# Patient Record
Sex: FEMALE | Race: WHITE | NOT HISPANIC OR LATINO | Employment: OTHER | URBAN - METROPOLITAN AREA
[De-identification: names, ages, dates, MRNs, and addresses within clinical notes are randomized per-mention and may not be internally consistent; named-entity substitution may affect disease eponyms.]

---

## 2017-01-03 ENCOUNTER — COMMUNICATION - HEALTHEAST (OUTPATIENT)
Dept: INTERNAL MEDICINE | Facility: CLINIC | Age: 77
End: 2017-01-03

## 2017-01-03 DIAGNOSIS — E11.9 TYPE 2 DIABETES MELLITUS (H): ICD-10-CM

## 2017-03-20 ENCOUNTER — COMMUNICATION - HEALTHEAST (OUTPATIENT)
Dept: INTERNAL MEDICINE | Facility: CLINIC | Age: 77
End: 2017-03-20

## 2017-03-20 DIAGNOSIS — E11.9 TYPE 2 DIABETES MELLITUS (H): ICD-10-CM

## 2017-03-24 ENCOUNTER — COMMUNICATION - HEALTHEAST (OUTPATIENT)
Dept: INTERNAL MEDICINE | Facility: CLINIC | Age: 77
End: 2017-03-24

## 2017-03-24 DIAGNOSIS — E11.9 DM (DIABETES MELLITUS) (H): ICD-10-CM

## 2017-06-21 ENCOUNTER — COMMUNICATION - HEALTHEAST (OUTPATIENT)
Dept: INTERNAL MEDICINE | Facility: CLINIC | Age: 77
End: 2017-06-21

## 2017-06-21 DIAGNOSIS — E78.5 HYPERLIPIDEMIA: ICD-10-CM

## 2017-06-21 DIAGNOSIS — E11.9 TYPE 2 DIABETES MELLITUS (H): ICD-10-CM

## 2017-06-21 DIAGNOSIS — I10 HTN (HYPERTENSION): ICD-10-CM

## 2017-06-26 ENCOUNTER — COMMUNICATION - HEALTHEAST (OUTPATIENT)
Dept: INTERNAL MEDICINE | Facility: CLINIC | Age: 77
End: 2017-06-26

## 2017-07-03 ENCOUNTER — HOSPITAL ENCOUNTER (OUTPATIENT)
Dept: MAMMOGRAPHY | Facility: CLINIC | Age: 77
Discharge: HOME | End: 2017-07-03
Attending: INTERNAL MEDICINE

## 2017-07-03 DIAGNOSIS — Z12.31 VISIT FOR SCREENING MAMMOGRAM: ICD-10-CM

## 2017-07-25 ENCOUNTER — COMMUNICATION - HEALTHEAST (OUTPATIENT)
Dept: INTERNAL MEDICINE | Facility: CLINIC | Age: 77
End: 2017-07-25

## 2017-07-25 DIAGNOSIS — I10 HTN (HYPERTENSION): ICD-10-CM

## 2017-07-25 DIAGNOSIS — E11.9 TYPE 2 DIABETES MELLITUS (H): ICD-10-CM

## 2017-07-25 DIAGNOSIS — E78.5 HYPERLIPIDEMIA: ICD-10-CM

## 2017-08-01 ENCOUNTER — OFFICE VISIT - HEALTHEAST (OUTPATIENT)
Dept: INTERNAL MEDICINE | Facility: CLINIC | Age: 77
End: 2017-08-01

## 2017-08-01 DIAGNOSIS — E11.9 DM (DIABETES MELLITUS) (H): ICD-10-CM

## 2017-08-01 DIAGNOSIS — I10 ESSENTIAL HYPERTENSION: ICD-10-CM

## 2017-08-01 DIAGNOSIS — E79.0 HYPERURICEMIA: ICD-10-CM

## 2017-08-01 DIAGNOSIS — N18.9 CHRONIC KIDNEY DISEASE, UNSPECIFIED: ICD-10-CM

## 2017-08-01 DIAGNOSIS — Z00.00 ROUTINE GENERAL MEDICAL EXAMINATION AT A HEALTH CARE FACILITY: ICD-10-CM

## 2017-08-01 DIAGNOSIS — E11.9 TYPE 2 DIABETES MELLITUS (H): ICD-10-CM

## 2017-08-01 DIAGNOSIS — E55.9 VITAMIN D DEFICIENCY: ICD-10-CM

## 2017-08-01 DIAGNOSIS — E78.5 HYPERLIPEMIA: ICD-10-CM

## 2017-08-01 LAB
CHOLEST SERPL-MCNC: 166 MG/DL
FASTING STATUS PATIENT QL REPORTED: ABNORMAL
HBA1C MFR BLD: 9.2 % (ref 3.5–6)
HDLC SERPL-MCNC: 45 MG/DL
LDLC SERPL CALC-MCNC: 52 MG/DL
TRIGL SERPL-MCNC: 344 MG/DL

## 2017-08-01 ASSESSMENT — MIFFLIN-ST. JEOR: SCORE: 1468.43

## 2017-08-14 ENCOUNTER — AMBULATORY - HEALTHEAST (OUTPATIENT)
Dept: EDUCATION SERVICES | Facility: CLINIC | Age: 77
End: 2017-08-14

## 2017-08-21 ENCOUNTER — COMMUNICATION - HEALTHEAST (OUTPATIENT)
Dept: EDUCATION SERVICES | Facility: CLINIC | Age: 77
End: 2017-08-21

## 2017-08-23 ENCOUNTER — COMMUNICATION - HEALTHEAST (OUTPATIENT)
Dept: INTERNAL MEDICINE | Facility: CLINIC | Age: 77
End: 2017-08-23

## 2017-08-23 DIAGNOSIS — I10 HTN (HYPERTENSION): ICD-10-CM

## 2017-08-23 DIAGNOSIS — E78.5 HYPERLIPIDEMIA: ICD-10-CM

## 2017-08-28 ENCOUNTER — COMMUNICATION - HEALTHEAST (OUTPATIENT)
Dept: INTERNAL MEDICINE | Facility: CLINIC | Age: 77
End: 2017-08-28

## 2017-08-28 ENCOUNTER — AMBULATORY - HEALTHEAST (OUTPATIENT)
Dept: INTERNAL MEDICINE | Facility: CLINIC | Age: 77
End: 2017-08-28

## 2017-08-28 ENCOUNTER — AMBULATORY - HEALTHEAST (OUTPATIENT)
Dept: EDUCATION SERVICES | Facility: CLINIC | Age: 77
End: 2017-08-28

## 2017-08-28 DIAGNOSIS — E11.8 DIABETES MELLITUS TYPE 2 WITH COMPLICATIONS (H): ICD-10-CM

## 2017-08-28 DIAGNOSIS — I10 HTN (HYPERTENSION): ICD-10-CM

## 2017-09-01 ENCOUNTER — RECORDS - HEALTHEAST (OUTPATIENT)
Dept: ADMINISTRATIVE | Facility: OTHER | Age: 77
End: 2017-09-01

## 2017-09-07 ENCOUNTER — COMMUNICATION - HEALTHEAST (OUTPATIENT)
Dept: ADMINISTRATIVE | Facility: CLINIC | Age: 77
End: 2017-09-07

## 2017-09-07 DIAGNOSIS — E11.9 TYPE 2 DIABETES MELLITUS WITHOUT COMPLICATION, WITH LONG-TERM CURRENT USE OF INSULIN (H): ICD-10-CM

## 2017-09-07 DIAGNOSIS — Z79.4 TYPE 2 DIABETES MELLITUS WITHOUT COMPLICATION, WITH LONG-TERM CURRENT USE OF INSULIN (H): ICD-10-CM

## 2017-09-13 ENCOUNTER — OFFICE VISIT - HEALTHEAST (OUTPATIENT)
Dept: INTERNAL MEDICINE | Facility: CLINIC | Age: 77
End: 2017-09-13

## 2017-09-13 DIAGNOSIS — M85.80 OSTEOPENIA: ICD-10-CM

## 2017-09-13 DIAGNOSIS — E78.5 HYPERLIPEMIA: ICD-10-CM

## 2017-09-13 DIAGNOSIS — Z78.0 MENOPAUSE: ICD-10-CM

## 2017-09-13 DIAGNOSIS — E11.9 TYPE 2 DIABETES MELLITUS (H): ICD-10-CM

## 2017-09-13 DIAGNOSIS — N18.9 CHRONIC KIDNEY DISEASE, UNSPECIFIED: ICD-10-CM

## 2017-09-13 DIAGNOSIS — I10 ESSENTIAL HYPERTENSION: ICD-10-CM

## 2017-09-19 ENCOUNTER — OFFICE VISIT - HEALTHEAST (OUTPATIENT)
Dept: INTERNAL MEDICINE | Facility: CLINIC | Age: 77
End: 2017-09-19

## 2017-09-19 ENCOUNTER — COMMUNICATION - HEALTHEAST (OUTPATIENT)
Dept: INTERNAL MEDICINE | Facility: CLINIC | Age: 77
End: 2017-09-19

## 2017-09-19 DIAGNOSIS — I10 HTN (HYPERTENSION): ICD-10-CM

## 2017-09-26 ENCOUNTER — RECORDS - HEALTHEAST (OUTPATIENT)
Dept: BONE DENSITY | Facility: CLINIC | Age: 77
End: 2017-09-26

## 2017-09-26 ENCOUNTER — RECORDS - HEALTHEAST (OUTPATIENT)
Dept: ADMINISTRATIVE | Facility: OTHER | Age: 77
End: 2017-09-26

## 2017-09-26 DIAGNOSIS — M85.80 OTHER SPECIFIED DISORDERS OF BONE DENSITY AND STRUCTURE, UNSPECIFIED SITE: ICD-10-CM

## 2017-09-26 DIAGNOSIS — Z78.0 ASYMPTOMATIC MENOPAUSAL STATE: ICD-10-CM

## 2017-10-02 ENCOUNTER — AMBULATORY - HEALTHEAST (OUTPATIENT)
Dept: INTERNAL MEDICINE | Facility: CLINIC | Age: 77
End: 2017-10-02

## 2017-10-02 DIAGNOSIS — M81.0 OSTEOPOROSIS: ICD-10-CM

## 2017-10-04 ENCOUNTER — OFFICE VISIT - HEALTHEAST (OUTPATIENT)
Dept: INTERNAL MEDICINE | Facility: CLINIC | Age: 77
End: 2017-10-04

## 2017-10-04 DIAGNOSIS — M89.9 DISORDER OF BONE AND CARTILAGE: ICD-10-CM

## 2017-10-04 DIAGNOSIS — M94.9 DISORDER OF BONE AND CARTILAGE: ICD-10-CM

## 2017-10-04 DIAGNOSIS — E21.3 HYPERPARATHYROIDISM (H): ICD-10-CM

## 2017-10-17 ENCOUNTER — AMBULATORY - HEALTHEAST (OUTPATIENT)
Dept: EDUCATION SERVICES | Facility: CLINIC | Age: 77
End: 2017-10-17

## 2018-03-04 ENCOUNTER — COMMUNICATION - HEALTHEAST (OUTPATIENT)
Dept: INTERNAL MEDICINE | Facility: CLINIC | Age: 78
End: 2018-03-04

## 2018-03-04 DIAGNOSIS — E11.9 DIABETES (H): ICD-10-CM

## 2018-03-04 DIAGNOSIS — Z79.4 TYPE 2 DIABETES MELLITUS WITHOUT COMPLICATION, WITH LONG-TERM CURRENT USE OF INSULIN (H): ICD-10-CM

## 2018-03-04 DIAGNOSIS — E11.9 TYPE 2 DIABETES MELLITUS WITHOUT COMPLICATION, WITH LONG-TERM CURRENT USE OF INSULIN (H): ICD-10-CM

## 2018-03-12 ENCOUNTER — COMMUNICATION - HEALTHEAST (OUTPATIENT)
Dept: INTERNAL MEDICINE | Facility: CLINIC | Age: 78
End: 2018-03-12

## 2018-03-15 ENCOUNTER — COMMUNICATION - HEALTHEAST (OUTPATIENT)
Dept: INTERNAL MEDICINE | Facility: CLINIC | Age: 78
End: 2018-03-15

## 2018-05-14 ENCOUNTER — COMMUNICATION - HEALTHEAST (OUTPATIENT)
Dept: INTERNAL MEDICINE | Facility: CLINIC | Age: 78
End: 2018-05-14

## 2018-05-14 DIAGNOSIS — E11.9 DM (DIABETES MELLITUS) (H): ICD-10-CM

## 2018-07-23 ENCOUNTER — COMMUNICATION - HEALTHEAST (OUTPATIENT)
Dept: INTERNAL MEDICINE | Facility: CLINIC | Age: 78
End: 2018-07-23

## 2018-07-23 DIAGNOSIS — E11.9 DM (DIABETES MELLITUS) (H): ICD-10-CM

## 2018-08-02 ENCOUNTER — OFFICE VISIT - HEALTHEAST (OUTPATIENT)
Dept: INTERNAL MEDICINE | Facility: CLINIC | Age: 78
End: 2018-08-02

## 2018-08-02 DIAGNOSIS — E66.01 MORBID OBESITY (H): ICD-10-CM

## 2018-08-02 DIAGNOSIS — Z00.00 ROUTINE GENERAL MEDICAL EXAMINATION AT A HEALTH CARE FACILITY: ICD-10-CM

## 2018-08-02 DIAGNOSIS — I10 ESSENTIAL HYPERTENSION: ICD-10-CM

## 2018-08-02 DIAGNOSIS — E55.9 VITAMIN D DEFICIENCY: ICD-10-CM

## 2018-08-02 DIAGNOSIS — E11.9 TYPE 2 DIABETES MELLITUS (H): ICD-10-CM

## 2018-08-02 DIAGNOSIS — N18.2 STAGE 2 CHRONIC KIDNEY DISEASE: ICD-10-CM

## 2018-08-02 DIAGNOSIS — E78.5 HYPERLIPEMIA: ICD-10-CM

## 2018-08-02 DIAGNOSIS — E21.3 HYPERPARATHYROIDISM (H): ICD-10-CM

## 2018-08-02 LAB
ALBUMIN SERPL-MCNC: 3.7 G/DL (ref 3.5–5)
ALP SERPL-CCNC: 63 U/L (ref 45–120)
ALT SERPL W P-5'-P-CCNC: <9 U/L (ref 0–45)
ANION GAP SERPL CALCULATED.3IONS-SCNC: 11 MMOL/L (ref 5–18)
AST SERPL W P-5'-P-CCNC: 17 U/L (ref 0–40)
BILIRUB SERPL-MCNC: 0.9 MG/DL (ref 0–1)
BUN SERPL-MCNC: 53 MG/DL (ref 8–28)
CALCIUM SERPL-MCNC: 9.8 MG/DL (ref 8.5–10.5)
CHLORIDE BLD-SCNC: 111 MMOL/L (ref 98–107)
CHOLEST SERPL-MCNC: 157 MG/DL
CO2 SERPL-SCNC: 19 MMOL/L (ref 22–31)
CREAT SERPL-MCNC: 1.7 MG/DL (ref 0.6–1.1)
CREAT UR-MCNC: 50.2 MG/DL
FASTING STATUS PATIENT QL REPORTED: YES
GFR SERPL CREATININE-BSD FRML MDRD: 29 ML/MIN/1.73M2
GLUCOSE BLD-MCNC: 83 MG/DL (ref 70–125)
HBA1C MFR BLD: 6.8 % (ref 3.5–6)
HDLC SERPL-MCNC: 48 MG/DL
LDLC SERPL CALC-MCNC: 64 MG/DL
MICROALBUMIN UR-MCNC: 1.02 MG/DL (ref 0–1.99)
MICROALBUMIN/CREAT UR: 20.3 MG/G
POTASSIUM BLD-SCNC: 4.7 MMOL/L (ref 3.5–5)
PROT SERPL-MCNC: 7 G/DL (ref 6–8)
PTH-INTACT SERPL-MCNC: 121 PG/ML (ref 10–86)
SODIUM SERPL-SCNC: 141 MMOL/L (ref 136–145)
TRIGL SERPL-MCNC: 223 MG/DL

## 2018-08-02 ASSESSMENT — MIFFLIN-ST. JEOR: SCORE: 1481.24

## 2018-08-03 LAB — 25(OH)D3 SERPL-MCNC: 31.7 NG/ML (ref 30–80)

## 2018-08-14 ENCOUNTER — COMMUNICATION - HEALTHEAST (OUTPATIENT)
Dept: INTERNAL MEDICINE | Facility: CLINIC | Age: 78
End: 2018-08-14

## 2018-08-23 ENCOUNTER — COMMUNICATION - HEALTHEAST (OUTPATIENT)
Dept: INTERNAL MEDICINE | Facility: CLINIC | Age: 78
End: 2018-08-23

## 2018-08-23 DIAGNOSIS — I10 HTN (HYPERTENSION): ICD-10-CM

## 2018-08-26 ENCOUNTER — COMMUNICATION - HEALTHEAST (OUTPATIENT)
Dept: INTERNAL MEDICINE | Facility: CLINIC | Age: 78
End: 2018-08-26

## 2018-08-26 DIAGNOSIS — I10 HTN (HYPERTENSION): ICD-10-CM

## 2018-08-27 RX ORDER — HYDROCHLOROTHIAZIDE 25 MG/1
TABLET ORAL
Qty: 90 TABLET | Refills: 3 | Status: SHIPPED | OUTPATIENT
Start: 2018-08-27 | End: 2023-07-19

## 2018-09-07 ENCOUNTER — COMMUNICATION - HEALTHEAST (OUTPATIENT)
Dept: INTERNAL MEDICINE | Facility: CLINIC | Age: 78
End: 2018-09-07

## 2018-09-14 ENCOUNTER — COMMUNICATION - HEALTHEAST (OUTPATIENT)
Dept: INTERNAL MEDICINE | Facility: CLINIC | Age: 78
End: 2018-09-14

## 2018-09-14 DIAGNOSIS — E78.5 HYPERLIPIDEMIA: ICD-10-CM

## 2018-09-15 RX ORDER — SIMVASTATIN 80 MG
80 TABLET ORAL AT BEDTIME
Qty: 30 TABLET | Refills: 8 | Status: SHIPPED | OUTPATIENT
Start: 2018-09-15 | End: 2023-07-19

## 2018-09-22 ENCOUNTER — COMMUNICATION - HEALTHEAST (OUTPATIENT)
Dept: INTERNAL MEDICINE | Facility: CLINIC | Age: 78
End: 2018-09-22

## 2018-09-22 DIAGNOSIS — E11.9 DM (DIABETES MELLITUS) (H): ICD-10-CM

## 2018-09-22 RX ORDER — PEN NEEDLE, DIABETIC 32GX 5/32"
NEEDLE, DISPOSABLE MISCELLANEOUS
Qty: 100 EACH | Refills: 3 | Status: SHIPPED | OUTPATIENT
Start: 2018-09-22 | End: 2023-07-19

## 2018-10-30 ENCOUNTER — RECORDS - HEALTHEAST (OUTPATIENT)
Dept: ADMINISTRATIVE | Facility: OTHER | Age: 78
End: 2018-10-30

## 2018-11-30 ENCOUNTER — COMMUNICATION - HEALTHEAST (OUTPATIENT)
Dept: INTERNAL MEDICINE | Facility: CLINIC | Age: 78
End: 2018-11-30

## 2018-11-30 DIAGNOSIS — E11.9 TYPE 2 DIABETES MELLITUS WITHOUT COMPLICATION, WITH LONG-TERM CURRENT USE OF INSULIN (H): ICD-10-CM

## 2018-11-30 DIAGNOSIS — Z79.4 TYPE 2 DIABETES MELLITUS WITHOUT COMPLICATION, WITH LONG-TERM CURRENT USE OF INSULIN (H): ICD-10-CM

## 2018-12-10 ENCOUNTER — COMMUNICATION - HEALTHEAST (OUTPATIENT)
Dept: INTERNAL MEDICINE | Facility: CLINIC | Age: 78
End: 2018-12-10

## 2018-12-10 DIAGNOSIS — E11.8 DIABETES MELLITUS TYPE 2 WITH COMPLICATIONS (H): ICD-10-CM

## 2019-02-15 ENCOUNTER — COMMUNICATION - HEALTHEAST (OUTPATIENT)
Dept: INTERNAL MEDICINE | Facility: CLINIC | Age: 79
End: 2019-02-15

## 2019-02-15 DIAGNOSIS — E11.9 DM (DIABETES MELLITUS) (H): ICD-10-CM

## 2019-02-18 RX ORDER — INSULIN GLARGINE 100 [IU]/ML
INJECTION, SOLUTION SUBCUTANEOUS
Qty: 45 ML | Refills: 4 | Status: SHIPPED | OUTPATIENT
Start: 2019-02-18 | End: 2023-07-19

## 2019-08-13 ENCOUNTER — COMMUNICATION - HEALTHEAST (OUTPATIENT)
Dept: INTERNAL MEDICINE | Facility: CLINIC | Age: 79
End: 2019-08-13

## 2019-08-13 DIAGNOSIS — M81.0 SENILE OSTEOPOROSIS: ICD-10-CM

## 2019-08-13 DIAGNOSIS — I10 HTN (HYPERTENSION): ICD-10-CM

## 2019-08-14 RX ORDER — METOPROLOL TARTRATE 25 MG/1
TABLET, FILM COATED ORAL
Qty: 180 TABLET | Refills: 0 | Status: SHIPPED | OUTPATIENT
Start: 2019-08-14 | End: 2023-07-19

## 2019-08-14 RX ORDER — ALENDRONATE SODIUM 70 MG/1
70 TABLET ORAL
Qty: 12 TABLET | Refills: 0 | Status: SHIPPED | OUTPATIENT
Start: 2019-08-14 | End: 2023-07-19

## 2019-08-14 RX ORDER — LISINOPRIL 40 MG/1
TABLET ORAL
Qty: 90 TABLET | Refills: 0 | Status: SHIPPED | OUTPATIENT
Start: 2019-08-14 | End: 2023-07-19

## 2019-08-26 ENCOUNTER — COMMUNICATION - HEALTHEAST (OUTPATIENT)
Dept: INTERNAL MEDICINE | Facility: CLINIC | Age: 79
End: 2019-08-26

## 2019-08-26 DIAGNOSIS — I10 HTN (HYPERTENSION): ICD-10-CM

## 2019-11-30 ENCOUNTER — COMMUNICATION - HEALTHEAST (OUTPATIENT)
Dept: INTERNAL MEDICINE | Facility: CLINIC | Age: 79
End: 2019-11-30

## 2019-11-30 DIAGNOSIS — E78.5 HYPERLIPIDEMIA: ICD-10-CM

## 2019-12-02 ENCOUNTER — COMMUNICATION - HEALTHEAST (OUTPATIENT)
Dept: INTERNAL MEDICINE | Facility: CLINIC | Age: 79
End: 2019-12-02

## 2019-12-02 DIAGNOSIS — E78.5 HYPERLIPIDEMIA: ICD-10-CM

## 2020-03-26 ENCOUNTER — COMMUNICATION - HEALTHEAST (OUTPATIENT)
Dept: INTERNAL MEDICINE | Facility: CLINIC | Age: 80
End: 2020-03-26

## 2020-03-26 DIAGNOSIS — E11.9 TYPE 2 DIABETES MELLITUS WITHOUT COMPLICATION, WITH LONG-TERM CURRENT USE OF INSULIN (H): ICD-10-CM

## 2020-03-26 DIAGNOSIS — Z79.4 TYPE 2 DIABETES MELLITUS WITHOUT COMPLICATION, WITH LONG-TERM CURRENT USE OF INSULIN (H): ICD-10-CM

## 2021-05-28 ENCOUNTER — RECORDS - HEALTHEAST (OUTPATIENT)
Dept: ADMINISTRATIVE | Facility: CLINIC | Age: 81
End: 2021-05-28

## 2021-05-29 ENCOUNTER — RECORDS - HEALTHEAST (OUTPATIENT)
Dept: ADMINISTRATIVE | Facility: CLINIC | Age: 81
End: 2021-05-29

## 2021-05-30 ENCOUNTER — RECORDS - HEALTHEAST (OUTPATIENT)
Dept: ADMINISTRATIVE | Facility: CLINIC | Age: 81
End: 2021-05-30

## 2021-05-31 VITALS — WEIGHT: 237.6 LBS | BODY MASS INDEX: 44.17 KG/M2

## 2021-05-31 VITALS — WEIGHT: 240 LBS | BODY MASS INDEX: 44.61 KG/M2

## 2021-05-31 VITALS — BODY MASS INDEX: 43.5 KG/M2 | WEIGHT: 234 LBS

## 2021-05-31 VITALS — BODY MASS INDEX: 44.24 KG/M2 | WEIGHT: 238 LBS

## 2021-05-31 VITALS — WEIGHT: 238 LBS | BODY MASS INDEX: 44.24 KG/M2

## 2021-05-31 VITALS — HEIGHT: 62 IN | WEIGHT: 234.38 LBS | BODY MASS INDEX: 43.13 KG/M2

## 2021-05-31 NOTE — TELEPHONE ENCOUNTER
Patient will be seeing a new physician at [facility]. Routing refills.  Arcelia Rose CMA ............... 4:23 PM, 08/14/19

## 2021-05-31 NOTE — TELEPHONE ENCOUNTER
Patient stated she was switching to [facility] last time we spoke. Patient was aware she would need an appointment before refills.  Arcelia Rose CMA ............... 1:21 PM, 08/26/19

## 2021-05-31 NOTE — TELEPHONE ENCOUNTER
Patient has not been seen in one year. Will need to establish care with a new physician now that Dr. Guadarrama is no longer here. Please help schedule. Offer other locations as well. We may be able to get a bridged refill once scheduled.

## 2021-05-31 NOTE — TELEPHONE ENCOUNTER
Former patient of Gertrudis Guadarrama & has not established care with another provider.  Please assign refill request to covering provider per Clinic standard process.  Angela Fisher RN, BAN, Nurse Advisor, [place] 11p-7a

## 2021-05-31 NOTE — TELEPHONE ENCOUNTER
Former patient of gertrudis Guadarrama & has not established care with another provider.  Please assign refill request to covering provider per Clinic standard process.      RN cannot approve Refill Request    RN can NOT refill this medication Protocol failed and NO refill given.     Alejandra Hunter, Care Connection Triage/Med Refill 8/26/2019    Requested Prescriptions   Pending Prescriptions Disp Refills     hydroCHLOROthiazide (HYDRODIURIL) 25 MG tablet [Pharmacy Med Name: hydroCHLOROthiazide Oral Tablet 25 MG] 90 tablet 2     Sig: TAKE 1 TABLET (25 MG TOTAL) BY MOUTH ONCE DAILY.       Diuretics/Combination Diuretics Refill Protocol  Failed - 8/26/2019  7:02 AM        Failed - Visit with PCP or prescribing provider visit in past 12 months     Last office visit with prescriber/PCP: 9/19/2017 Gertrudis Guadarrama MD OR same dept: Visit date not found OR same specialty: 10/4/2017 Madisyn Dutta MD  Last physical: 8/2/2018 Last MTM visit: Visit date not found   Next visit within 3 mo: Visit date not found  Next physical within 3 mo: Visit date not found  Prescriber OR PCP: Gertrudis Guadarrama MD  Last diagnosis associated with med order: 1. HTN (hypertension)  - hydroCHLOROthiazide (HYDRODIURIL) 25 MG tablet [Pharmacy Med Name: hydroCHLOROthiazide Oral Tablet 25 MG]; TAKE 1 TABLET (25 MG TOTAL) BY MOUTH ONCE DAILY.  Dispense: 90 tablet; Refill: 2    If protocol passes may refill for 12 months if within 3 months of last provider visit (or a total of 15 months).             Failed - Serum Potassium in past 12 months      No results found for: LN-POTASSIUM          Failed - Serum Sodium in past 12 months      No results found for: LN-SODIUM          Failed - Blood pressure on file in past 12 months     BP Readings from Last 1 Encounters:   08/02/18 132/84             Failed - Serum Creatinine in past 12 months      Creatinine   Date Value Ref Range Status   08/02/2018 1.70 (H) 0.60 - 1.10 mg/dL Final

## 2021-06-01 VITALS — BODY MASS INDEX: 43.65 KG/M2 | WEIGHT: 237.2 LBS | HEIGHT: 62 IN

## 2021-06-03 NOTE — TELEPHONE ENCOUNTER
Left voicemail for patient to return call to clinic. When patient returns call, please give them below message.      She hasn't been in for an office visit for sometime now and  She would need to make an office visit for refill of her medication   She is requesting.  No PCP? If no please help to set up an Establish Care Appointment.

## 2021-06-03 NOTE — TELEPHONE ENCOUNTER
No PCP.  Please assign refill request to covering provider per Clinic standard process.      RN cannot approve Refill Request    RN can NOT refill this medication Protocol failed and NO refill given. Last office visit: 9/19/2017 Gertrudis Guadarrama MD Last Physical: 8/2/2018 Last MTM visit: Visit date not found Last visit same specialty: 10/4/2017 Madisyn Dutta MD.  Next visit within 3 mo: Visit date not found  Next physical within 3 mo: Visit date not found      Nancy Burgos, [facility] Connection Triage/Med Refill 11/30/2019    Requested Prescriptions   Pending Prescriptions Disp Refills     simvastatin (ZOCOR) 80 MG tablet [Pharmacy Med Name: Simvastatin Oral Tablet 80 MG] 30 tablet 7     Sig: TAKE ONE TABLET BY MOUTH AT BEDTIME       Statins Refill Protocol (Hmg CoA Reductase Inhibitors) Failed - 11/30/2019  3:35 PM        Failed - PCP or prescribing provider visit in past 12 months      Last office visit with prescriber/PCP: 9/19/2017 Gertrudis Guadarrama MD OR same dept: Visit date not found OR same specialty: 10/4/2017 Madisyn Dutta MD  Last physical: 8/2/2018 Last MTM visit: Visit date not found   Next visit within 3 mo: Visit date not found  Next physical within 3 mo: Visit date not found  Prescriber OR PCP: Gertrudis Guadarrama MD  Last diagnosis associated with med order: 1. Hyperlipidemia  - simvastatin (ZOCOR) 80 MG tablet [Pharmacy Med Name: Simvastatin Oral Tablet 80 MG]; TAKE ONE TABLET BY MOUTH AT BEDTIME  Dispense: 30 tablet; Refill: 7    If protocol passes may refill for 12 months if within 3 months of last provider visit (or a total of 15 months).

## 2021-06-03 NOTE — TELEPHONE ENCOUNTER
RN cannot approve Refill Request    RN can NOT refill this medication PCP messaged that patient is overdue for Office Visit. Last office visit: 9/19/2017 Gertrudis Guadarrama MD Last Physical: 8/2/2018 Last MTM visit: Visit date not found Last visit same specialty: 10/4/2017 Madisyn Dutta MD.  Next visit within 3 mo: Visit date not found  Next physical within 3 mo: Visit date not found      Alyssa Pollock, [facility] Connection Triage/Med Refill 12/3/2019    Requested Prescriptions   Pending Prescriptions Disp Refills     simvastatin (ZOCOR) 80 MG tablet [Pharmacy Med Name: Simvastatin Oral Tablet 80 MG] 30 tablet 7     Sig: TAKE ONE TABLET BY MOUTH AT BEDTIME       Statins Refill Protocol (Hmg CoA Reductase Inhibitors) Failed - 12/2/2019  3:37 PM        Failed - PCP or prescribing provider visit in past 12 months      Last office visit with prescriber/PCP: 9/19/2017 Gertrudis Guadarrama MD OR same dept: Visit date not found OR same specialty: 10/4/2017 Madisyn Dutta MD  Last physical: 8/2/2018 Last MTM visit: Visit date not found   Next visit within 3 mo: Visit date not found  Next physical within 3 mo: Visit date not found  Prescriber OR PCP: Gertrudis Guadarrama MD  Last diagnosis associated with med order: 1. Hyperlipidemia  - simvastatin (ZOCOR) 80 MG tablet [Pharmacy Med Name: Simvastatin Oral Tablet 80 MG]; TAKE ONE TABLET BY MOUTH AT BEDTIME  Dispense: 30 tablet; Refill: 7    If protocol passes may refill for 12 months if within 3 months of last provider visit (or a total of 15 months).

## 2021-06-03 NOTE — TELEPHONE ENCOUNTER
Patient Returning Call  Reason for call:  medication  Information relayed to patient: The writer read the following to patient per clinic staff: Left voicemail for patient to return call to clinic. When patient returns call, please give them below message.   She hasn't been in for an office visit for sometime now and  She would need to make an office visit for refill of her medication   She is requesting.  No PCP? If no please help to set up an Establish Care Appointment   Patient has additional questions:  Yes  If YES, what are your questions/concerns:  Patient states she spoke to clinic staff yesterday.  She reports she no longer is here at [facility].  Disregard refill request.   Okay to leave a detailed message?: No call back needed

## 2021-06-12 NOTE — PROGRESS NOTES
ASSESSMENT and PLAN:  1. Chronic Renal Insufficiency  We'll monitor this closely w/ adjustment in her meds.    2. Hyperlipemia  Stable    3. Essential hypertension  Suboptimal control.  She's retaining fluid based on her sx and her weight.  We discussed adding lasix.  Med side effects discussed.  She's going to have short term follow up given her other co-morbidities, particularly her renal insufficiency.  I haven't started her on K+,b/c she's on 40 mg of lisiniopril w/ ckd.  She'll be f/u w/ me next week.  - furosemide (LASIX) 20 MG tablet; Take 1 tablet (20 mg total) by mouth daily.  Dispense: 30 tablet; Refill: 1    4. Type 2 diabetes mellitus  Improving control.    5. Menopause  She's due for a f/u dxa  - DXA Bone Density Scan; Future    6. Osteopenia  She's due for a f/u dx  - DXA Bone Density Scan; Future      Medications Discontinued During This Encounter   Medication Reason     glipiZIDE (GLUCOTROL) 10 MG tablet Therapy completed       No Follow-up on file.    Patient Instructions   I'll send lasix to your pharmacy.    Please see me back in one week for a BP recheck and non-fasting labs.    [phone] DEXA    Take care!      CHIEF COMPLAINT:  Chief Complaint   Patient presents with     Follow-up     HTN        HISTORY OF PRESENT ILLNESS:  Gi Bailey is a 77 y.o. female  presenting to the clinic today for follow up of her HTN.  I'd had her stop her hctz over concern about possible gout w/ elevated uric acid level.  With that, her blood pressures became elevated.  I had her increase her lisinopril from 20-40 mg.  The first few days that she was off of her hydrochlorothiazide, her skin felt tight.  She notes that her weight has gone up a few pounds in a short amount of time.  She has not been monitoring her blood pressures at home.    She is also following up on her type 2 diabetes.  She is working with the diabetes educators.  She is checking her blood sugars 3 times a day.  She is taking  NovoLog 6 units in the morning and 4 units before dinner.  Her Lantus dose ranges from 64-68 units.  With this, her fasting blood sugars have been between 115 and 130.  The lowest blood sugar that she has had recently is 105.  She has had no hypoglycemia.    REVIEW OF SYSTEMS:    All other systems are negative.    TOBACCO USE:  History   Smoking Status     Never Smoker   Smokeless Tobacco     Never Used       VITALS:  Vitals:    09/13/17 1323   BP: 150/80   Pulse: 64   Weight: (!) 240 lb (108.9 kg)     Wt Readings from Last 3 Encounters:   09/13/17 (!) 240 lb (108.9 kg)   08/28/17 (!) 238 lb (108 kg)   08/14/17 (!) 234 lb (106.1 kg)         PHYSICAL EXAM:  Constitutional:  Reveals an alert, pleasant adult female.   Vitals:  Noted.       QUALITY MEASURES:  The following high BMI interventions were performed this visit: weight monitoring    MEDICATIONS:  Current Outpatient Prescriptions   Medication Sig Dispense Refill     aspirin 325 MG tablet Take 325 mg by mouth daily.       cholecalciferol, vitamin D3, (VITAMIN D3) 1,000 unit capsule Take 2,000 Units by mouth daily.       DOCOSAHEXANOIC ACID/EPA (FISH OIL ORAL) Take 1 capsule by mouth daily.       generic lancets (ONETOUCH ULTRASOFT) TEST 2 TO 3 TIMES DAILY 100 each 11     insulin aspart (NOVOLOG FLEXPEN) 100 unit/mL injection pen Inject 6-10 Units under the skin 2 (two) times a day. 15 mL 1     insulin glargine (LANTUS SOLOSTAR) 100 unit/mL (3 mL) pen Inject 50 Units under the skin at bedtime. As directed 45 mL 3     lisinopril (PRINIVIL,ZESTRIL) 40 MG tablet Take 1 tablet (40 mg total) by mouth daily. 30 tablet 11     metoprolol tartrate (LOPRESSOR) 25 MG tablet TAKE ONE TABLET BY MOUTH TWICE DAILY  60 tablet 11     MULTIVITAMIN ORAL Take 1 tablet by mouth daily.       mupirocin (BACTROBAN) 2 % ointment 2 (two) times a day. Apply sparingly to affected areas       ONETOUCH ULTRA TEST strips TEST FOUR TIMES DAILY 400 strip 1     pen needle, diabetic (BD ULTRA-FINE  "FIDELINA PEN NEEDLES) 32 gauge x 5/32\" Ndle Use 2 per day. 100 each 4     simvastatin (ZOCOR) 80 MG tablet TAKE 1 TABLET BY MOUTH EVERY NIGHT AT BEDTIME 30 tablet 11     furosemide (LASIX) 20 MG tablet Take 1 tablet (20 mg total) by mouth daily. 30 tablet 1     No current facility-administered medications for this visit.      "

## 2021-06-12 NOTE — PROGRESS NOTES
Assessment:      Healthy female exam.      Plan:      Diagnoses and all orders for this visit:    Routine general medical examination at a health care facility    Chronic Renal Insufficiency    Hyperlipemia  -     Lipid Cascade  -     Comprehensive Metabolic Panel    Essential hypertension  -     Comprehensive Metabolic Panel    Type 2 diabetes mellitus  -     Glycosylated Hemoglobin A1c  -     Comprehensive Metabolic Panel  -     Microalbumin, Random Urine  -     Ambulatory referral to Diabetes Education (Existing Diagnosis)    Vitamin D deficiency    Hyperuricemia  Given her exam, I'm concerned about gout.  She'll like need to stop her hctz.  We could increase her lisinopril if needed for BP control.  -     Uric Acid    DM (diabetes mellitus)  She sounds poorly controlled.  She may need trulicity or meal time insulin.  She'll check her numbers.  Im going to have her increase her lantus to 45, then 50 units at HS and f/u w/ DM education.  -     insulin glargine (LANTUS SOLOSTAR) 100 unit/mL (3 mL) pen; Inject 50 Units under the skin at bedtime. As directed  Dispense: 45 mL; Refill: 3      The following high BMI interventions were performed this visit: weight monitoring    Patient Instructions   Please increase your lantus to 45 units for one week.  If your AM fasting sugars are under 150 most days with that, you can continue.  If not, please increase to 50 units.  Please also follow up with Diabetes Education for on-going insulin adjustments.    Today's labs will be available on [facility].  If you aren't signed up, then we'll mail them out.  If anything needs more immediate follow up, we'll also call.    Take care!        Subjective:      Gi Bailey is a 77 y.o. female who presents for an annual exam.  She's also following up on her DM2.  She was last in a year ago.  She's on glipizide 10mg bid and lantus 40 units daily.  She's had a lot of poor diet choices in the past year; mostly related to the loss of son to  "sepsis shortly after the fourth of July last year.  Her sugars are running in the 200s fasting.  She hasn't had any hypos; she does have awareness when her sugars are at 75.    She had what was likely gout in a toe last year.  She hasn't had any known recurrence, but does have some pain/stiffness/swelling in her knuckle of her third finger on the right.  It's worse in the AM.      Colonoscopy: Yes  Last Dexa: 2015  Last mammogram: 7/3/17      Gynecologic History  No LMP recorded. Patient has had a hysterectomy.  Contraception: status post hysterectomy  Last Pap: n/a    Current Outpatient Prescriptions   Medication Sig Dispense Refill     aspirin 325 MG tablet Take 325 mg by mouth daily.       blood glucose test (ONETOUCH ULTRA TEST) strips TEST 4 TIMES DAILY 100 strip 3     cholecalciferol, vitamin D3, (VITAMIN D3) 1,000 unit capsule Take 2,000 Units by mouth daily.       DOCOSAHEXANOIC ACID/EPA (FISH OIL ORAL) Take 1 capsule by mouth daily.       generic lancets (ONETOUCH ULTRASOFT) TEST 2 TO 3 TIMES DAILY 100 each 11     glipiZIDE (GLUCOTROL) 10 MG tablet TAKE ONE TABLET BY MOUTH TWICE DAILY BEFORE MEALS  60 tablet 0     hydroCHLOROthiazide (HYDRODIURIL) 25 MG tablet TAKE ONE TABLET BY MOUTH ONE TIME DAILY  30 tablet 0     insulin glargine (LANTUS SOLOSTAR) 100 unit/mL (3 mL) pen Inject 50 Units under the skin at bedtime. As directed 45 mL 3     lisinopril (PRINIVIL,ZESTRIL) 20 MG tablet TAKE ONE TABLET BY MOUTH ONE TIME DAILY  30 tablet 0     metoprolol tartrate (LOPRESSOR) 25 MG tablet TAKE ONE TABLET BY MOUTH TWICE DAILY  60 tablet 0     MULTIVITAMIN ORAL Take 1 tablet by mouth daily.       mupirocin (BACTROBAN) 2 % ointment 2 (two) times a day. Apply sparingly to affected areas       pen needle, diabetic (BD INSULIN PEN NEEDLE UF SHORT) 31 gauge x 5/16\" Ndle Inject 1 each under the skin daily. 100 each 0     simvastatin (ZOCOR) 80 MG tablet TAKE 1 TABLET BY MOUTH EVERY NIGHT AT BEDTIME 30 tablet 0     No " "current facility-administered medications for this visit.      Past Medical History:   Diagnosis Date     Colon cancer 1994     Past Surgical History:   Procedure Laterality Date     HYSTERECTOMY       OOPHORECTOMY       RI TOTAL ABDOM HYSTERECTOMY      Description: Hysterectomy;  Recorded: 01/22/2009;  Comments: 2000     Penicillins and Pioglitazone  No family history on file.  Social History     Social History     Marital status:      Spouse name: N/A     Number of children: N/A     Years of education: N/A     Occupational History     Not on file.     Social History Main Topics     Smoking status: Never Smoker     Smokeless tobacco: Never Used     Alcohol use Not on file     Drug use: Not on file     Sexual activity: Not on file     Other Topics Concern     Not on file     Social History Narrative       Review of Systems  General:  wt up 10 lbs in the last year  Eyes: blurry vision  Ears/Nose/Throat: sinus infxns  Cardiovascular:+HTN  Respiratory:  short of breath w/ stairs  Gastrointestinal:  no concerns, Genitourinary: frequent voiding; up at night to void  Musculoskeletal:  joint pain  Skin: no concerns  Neurologic: no concerns  Psychiatric: no concerns  Endocrine: no concerns  Heme/Lymphatic: no concerns   Allergic/Immunologic: no concerns      Objective:         Vitals:    08/01/17 1041   BP: 122/80   Pulse: 68   Weight: (!) 234 lb 6 oz (106.3 kg)   Height: 5' 1.5\" (1.562 m)       Vitals:  Vitals:    08/01/17 1041   BP: 122/80   Patient Site: Right Arm   Pulse: 68   Weight: (!) 234 lb 6 oz (106.3 kg)   Height: 5' 1.5\" (1.562 m)     Wt Readings from Last 3 Encounters:   08/01/17 (!) 234 lb 6 oz (106.3 kg)   06/23/16 (!) 224 lb (101.6 kg)   05/25/16 (!) 227 lb 11.2 oz (103.3 kg)     Body mass index is 43.57 kg/(m^2).    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

## 2021-06-12 NOTE — PROGRESS NOTES
Assessment: Gi is in for a follow up on her diabetes management.  Her last a1c was 9.2.  She is positive for microalbuminuria and stage 3 chronic kidney disease.  She would like to improve her diabetes management to help her kidneys.  She is currently on 50 units of lantus & taking glipizide 10mg BID.  Her morning blood sugars did not drop much after an increase from 45 to 50 units of lantus 5 days ago.  Her morning blood sugars average 200-230 and her later in the day readings range from 240-350.  I told her to increase her lantus to 58 units starting tonight and introduced her to novolog today.  Her lunchtime meal is generally her smallest meal of the day when she eats a salad or has a slimfast.  Breakfast & dinner are larger and would benefit the most from mealtime insulin.  I did not want to overwhelm her with 3 extra injections a day.  She says she feels comfortable doing these extra two injections daily.  She will start taking 4 units of novolog before breakfast and before dinner.  She was given a sample of novolog to use for the next three weeks.     She walks on her treadmill about 15 minutes daily and does exercises for 10 minutes every morning.  She would like to lose 30 pounds and get down to 200lbs.  We will discuss this goal further when her blood sugars start to normalize.  She is planning to see her eye doctor within a month for an exam.    Plan: Increase lantus to 58 units and start 4 units of novolog before breakfast and dinner.  I will discuss discontinuing the glipizide now that she is taking mealtime insulin with Dr. Guadarrama on 8/15/17.  I will call her in 1 week to review her blood sugars.  She will continue to check her blood sugar 3+x/daily & call me with any lows or questions in the meantime.  Our next appointment is in 2 weeks to continue reducing her high blood sugars & a1c    Subjective and Objective:      Gi Bailey is referred by Gertrudis Guadarrama for Diabetes Education.     Lab  Results   Component Value Date    HGBA1C 9.2 (H) 08/01/2017         Current diabetes medications:  Lantus 50 units, Glipizide 10mg BID    Goals        Patient Stated      weight loss (pt-stated)            8/14/17: Gi would like to lose weight and get down to 200lbs.  Today she is at 234lbs.  We will focus on this goal more once her blood sugars have normalized.         Other      A1C <8.0            8/14/17:  Gi would like to reduce her a1c <8.0 to prevent future complications.  Her insulin doses are being titrated every 2 weeks to accomplish this.        Medication            8/14/17: Gi will take her lantus nightly and novolog two times daily.        Monitor            Patient will test 2-3 times per day.  - Once in the morning and once before and after a meal.  8/14/17: Met and continuing at our appointment.              Follow up:   Primary care visit  CDE (certified diabetic educator)      Education:     Monitoring   Meter (per above goals): Assessed and Discussed  Monitoring: Assessed and Discussed  BG goals: Assessed and Discussed    Nutrition Management  Nutrition Management: Assessed and Discussed  Weight: Assessed and Discussed  Portions/Balance: Assessed and Discussed  Carb ID/Count: Assessed and Discussed  Label Reading: Not addressed  Heart Healthy Fats: Not addressed  Menu Planning: Not addressed  Dining Out: Not addressed  Physical Activity: Assessed and Discussed  Medications: Assessed and Discussed  Orals: Assessed and Discussed  Injected Medications: Assessed and Discussed   Storage/Exp:Assessed and Discussed   Site Rotation: Assessed and Discussed   Sites Assessed: yes    Diabetes Disease Process: Assessed, Discussed and Literature provided    Acute Complications: Prevent, Detect, Treat:  Hypoglycemia: Assessed and Discussed  Hyperglycemia: Assessed and Discussed  Sick Days: Not addressed  Driving: Not addressed    Chronic Complications  Foot Care:Assessed and Discussed  Skin Care: Not  addressed  Eye: Assessed and Discussed  ABC: Assessed and Discussed  Teeth:Not addressed  Goal Setting and Problem Solving: Assessed and Discussed  Barriers: Assessed and Discussed  Psychosocial Adjustments: Assessed and Discussed      Time spent with the patient: 60 minutes for diabetes education and counseling.   Previous Education: yes  Visit Type:DSMT  Hours Remaining: DSMT 1 and MNT 2      Chin Mckeon  8/14/2017

## 2021-06-12 NOTE — PROGRESS NOTES
Assessment: Gi is in for a follow up on her diabetes management.  She is currently taking 6 units of novolog for breakfast and dinner & 60 units of lantus daily.  Her blood sugars have decreased before breakfast, after breakfast, & before lunch.  She ranges from 120-170 on average for these times.  She has been checking her blood sugar before dinner and they range from 180-240.  Today I introduced her to a sliding scale to help bring down higher blood sugars at meals.  The sliding scale is as follows for breakfast time:   0 units - <160   1 unit - 161-200  2 units - 201-250  3 units - 251+   At dinner time she will reduce this scale by one unit.  She did not have any bedtime blood sugars to determine if she needs less/more insulin at dinner.  She has had no low blood sugars for the past 2 weeks.  I discussed reducing/increasing her novolog dose based on the amount of food/carbs she eats.  This morning she only had a yogurt and still injected 6 units of novolog.  I discussed reducing that dose by 2 units next time to make sure she does not go low.  She reports her finger sticks have been hurting her.  Her needle depth is turned up to 9/10 so I discussed reducing that to 4 or 5 and milking the base of her finger to get an adequate blood sample.  We did a demonstration and she stated it didn't hurt as much.    Gi states that her HCTZ dose was recently discontinued & that needs to have her blood pressure checked today.  At the beginning of our appointment it was measured at 180/70.  She denied any symptoms of headache or feeling anything out of the norm.  It was rechecked 40 minutes later at 178/78 & again 5 minutes after at 160/74.  She has gained 4-5 pounds since her last weigh in which may possibly be due to water weight.  She states that she has been increasing her exercise by 15-30 minutes daily to help with weight loss.  Ghulam KHAN & Dr. Rodriguez from internal medicine were notified of her blood pressure & told  me to have her follow up very soon with her primary.  I sent a message to Dr. Gertrudis Guadarrama updating her on Gi's BP.      Plan: Gi will increase her lantus to 64 units.  She will incorporate her sliding scale into her average meal insulin dose of 6 units of novolog.  Glipizide is discontinued due to novolog mealtime dosing.  She will check her blood sugars 3x/daily before breakfast, dinner, & bedtime.  She will continue her exercise 30-60 minutes daily to help with weight loss.  She will call me with any lows or questions.  I will follow up with her in 1 week to discuss her blood sugars and next appointment.  I asked her if she would like to follow up with an dietician diabetes educator as medicare does not allow more than 2 hours per calendar year with an RN.  She declined for now.      Subjective and Objective:      Gi Bailey is referred by Dr. Gertrudis Guadarrama for Diabetes Education.     Lab Results   Component Value Date    HGBA1C 9.2 (H) 08/01/2017         Current diabetes medications:  lantus 60 units, novolog 6 BID.    Goals        Patient Stated      weight loss (pt-stated)            8/14/17: Gi would like to lose weight and get down to 200lbs.  Today she is at 234lbs.  We will focus on this goal more once her blood sugars have normalized.  8/28/17:  Gi weighed at 238lbs today.  This may be due to d/c'ing a diuretic.  She has been increasing her exercise to 30-60+ minutes daily.  She will continue to workout to help with weight loss.         Other      A1C <8.0            8/14/17:  Gi would like to reduce her a1c <8.0 to prevent future complications.  Her insulin doses are being titrated every 2 weeks to accomplish this.        Medication            8/14/17: Gi will take her lantus nightly and novolog two times daily.  8/28/17:  Goal met and continuing.        Monitor            Patient will test 2-3 times per day.  - Once in the morning and once before and after a meal.  8/14/17: Met and  continuing at our appointment.    8/28/17: Met and continuing.  Asked her to check before breakfast, dinner, & bedtime.            Follow up:   Primary care visit      Education:     Monitoring   Meter (per above goals): Assessed and Discussed  Monitoring: Assessed and Discussed  BG goals: Assessed and Discussed    Nutrition Management  Nutrition Management: Assessed and Discussed  Weight: Assessed and Discussed  Portions/Balance: Assessed, Discussed and Literature provided  Carb ID/Count: Assessed, Discussed and Literature provided  Label Reading: Assessed, Discussed and Literature provided  Heart Healthy Fats: Discussed  Menu Planning: Assessed and Discussed  Dining Out: Assessed and Discussed  Physical Activity: Assessed  Medications: Assessed and Discussed  Orals: Assessed and Discussed  Injected Medications: Assessed and Discussed   Storage/Exp:Assessed and Discussed   Site Rotation: Assessed and Discussed   Sites Assessed: yes    Diabetes Disease Process: Assessed and Discussed    Acute Complications: Prevent, Detect, Treat:  Hypoglycemia: Assessed and Discussed  Hyperglycemia: Assessed and Discussed  Sick Days: Not addressed  Driving: Not addressed    Chronic Complications  Foot Care:Assessed and Discussed  Skin Care: Not addressed  Eye: Assessed and Discussed  ABC: Assessed and Discussed  Teeth:Not addressed  Goal Setting and Problem Solving: Assessed and Discussed  Barriers: Assessed and Discussed  Psychosocial Adjustments: Assessed and Discussed      Time spent with the patient: 60 minutes for diabetes education and counseling.   Previous Education: yes  Visit Type:DSMT  Hours Remaining: DSMT 0 and MNT 2      Chin Mckeon  8/28/2017

## 2021-06-13 NOTE — PROGRESS NOTES
Assessment/Plan:        1. Osteopenia  Parathyroid Hormone Intact with Minerals    Vitamin D, Total (25-Hydroxy)    DXA Bone Density Scan   2. Hyperparathyroidism  Parathyroid Hormone Intact with Minerals    Vitamin D, Total (25-Hydroxy)       Return in about 1 year (around 10/4/2018) for Recheck.    Patient Instructions   DXA in 1 year if tolerates Fosamax well.      Chief Complaint   Patient presents with     Osteoporosis Follow Up     discuss bone density     Patient is here today for the follow-up visit of osteopenia with high fracture risk.  I saw her last time more than 2 years ago.  She now has a repeat bone density scan in September 2017 which showed osteopenia with high fracture risk.  She reports that she was taking oral bisphosphonate, probably Fosamax for only 3 months many many years ago and she cannot recall why she stopped it.  She does not have any severe renal insufficiency, history of GI bleed or stomach surgeries.  The blood work at that time showed significant hyperparathyroidism with very high PTH and questionable parathyroid adenoma.  For some reason she did not follow-up with me for osteopenia treatment or hyperparathyroidism for more than 2 years now.  Nothing was done about that.  I will repeat her parathyroid tests, we will restart Fosamax.  All the side effects of medication and appropriate taking of the pill is discussed.  I will check her vitamin D level 2.  If she can tolerate Fosamax well, I will see her for the follow-up in 1 year.  I spent a lot of time reviewing her records in my note from more than 2 years ago.  She did have ultrasound and nuclear medicine study of the parathyroid gland done at that time.  Ultrasound did not show parathyroid adenoma which was initially seen on the nuclear medicine study.  It did show some thyroid nodule which was biopsied at that time.    /80  Pulse 66  Wt (!) 237 lb 9.6 oz (107.8 kg)  BMI 44.17 kg/m2  25 minutes spent with the patient and  "more then 50 % of the time in counseling.  This note has been dictated using voice recognition software. Any grammatical or context distortions are unintentional and inherent to the software      Patient Active Problem List   Diagnosis     Osteopenia     Colon Cancer     Chronic Renal Insufficiency     Vitamin D Deficiency     Hyperlipemia     Hypertension     Type 2 diabetes mellitus     Hyperparathyroidism       Current Outpatient Prescriptions on File Prior to Visit   Medication Sig Dispense Refill     aspirin 325 MG tablet Take 325 mg by mouth daily.       cholecalciferol, vitamin D3, (VITAMIN D3) 1,000 unit capsule Take 2,000 Units by mouth daily.       DOCOSAHEXANOIC ACID/EPA (FISH OIL ORAL) Take 1 capsule by mouth daily.       generic lancets (ONETOUCH ULTRASOFT) TEST 2 TO 3 TIMES DAILY 100 each 11     hydroCHLOROthiazide (HYDRODIURIL) 25 MG tablet Take 1 tablet (25 mg total) by mouth daily. 90 tablet 3     insulin aspart (NOVOLOG FLEXPEN) 100 unit/mL injection pen Inject 6-10 Units under the skin 2 (two) times a day. 15 mL 1     insulin glargine (LANTUS SOLOSTAR) 100 unit/mL (3 mL) pen Inject 50 Units under the skin at bedtime. As directed 45 mL 3     lisinopril (PRINIVIL,ZESTRIL) 40 MG tablet Take 1 tablet (40 mg total) by mouth daily. 30 tablet 11     metoprolol tartrate (LOPRESSOR) 25 MG tablet TAKE ONE TABLET BY MOUTH TWICE DAILY  60 tablet 11     MULTIVITAMIN ORAL Take 1 tablet by mouth daily.       mupirocin (BACTROBAN) 2 % ointment 2 (two) times a day. Apply sparingly to affected areas       ONETOUCH ULTRA TEST strips TEST FOUR TIMES DAILY 400 strip 1     pen needle, diabetic (BD ULTRA-FINE FIDELINA PEN NEEDLES) 32 gauge x 5/32\" Ndle Use 2 per day. 100 each 4     simvastatin (ZOCOR) 80 MG tablet TAKE 1 TABLET BY MOUTH EVERY NIGHT AT BEDTIME 30 tablet 11     No current facility-administered medications on file prior to visit.      "

## 2021-06-13 NOTE — PROGRESS NOTES
Assessment: Gi is in for a follow up on diabetes management today.  She has been taking 68 units of lantus and novolog @ breakfast & dinner.  She takes a baseline of 6 units at breakfast and 4 units at dinner.  For breakfast she often eats an english muffin with alejandra and 1/2 a banana.  For lunch she has a yogurt with fruit, a slimfast, or a chicken wrap.  She is contemplating stopping the slimfast and trying Glucerna Hunger Smart for more controlled blood sugars after lunch.  After dinner time she has a snack like a fruit cup or popcorn a few days per week.  Her blood sugars in the morning range from 120s-170s, before dinner are 180-310s, & before bedtime are 230-320s.  I discussed pairing everything she eats with insulin so that mean injecting at lunch time to help bring down her evening blood sugars.  I recommended increasing her breakfast to 7 units, lunch to 3 units, & dinner to 6 units.  She will decrease her lantus to 64 units to prevent too steep of a drop in blood sugars over night.  Sometimes she goes out to eat or has bread, which she states raises her blood sugar more.  I recommended injecting 1-2 units more of novolog to prevent a high after this meal.  She is on a sliding scale of approximately 1:50 >150.  She has been using this proficiently to help bring down high blood sugar numbers.  Gi has shown great advancement with her diabetes management.  She is focused on improving her health outcomes and her next a1c.    Plan: Gi will reduce her lantus to 64 units and start to inject novolog at lunchtime.  Her baseline for meals is 7/3/6 units of novolog.  She will inject 1-2 units more for bigger meals & 1-2 units less for really low carb meals.  Her sliding scale is 1 unit for every 50 above 150 to correct high blood sugars.  She knows that if she exercises more in a day to have extra carbs on hand for a low or to inject 1 unit less at the meal prior.  She will call me with any blood sugars <85.   We will follow up in 3 months to review how she is doing on this plan.    Subjective and Objective:      Gi Bailey is referred by Dr. Gertrudis Guadarrama for Diabetes Education.     Lab Results   Component Value Date    HGBA1C 9.2 (H) 08/01/2017         Current diabetes medications:  lantus 68 units, novolog at meals    Goals        Patient Stated      weight loss (pt-stated)            8/14/17: Gi would like to lose weight and get down to 200lbs.  Today she is at 234lbs.  We will focus on this goal more once her blood sugars have normalized.  8/28/17:  Gi weighed at 238lbs today.  This may be due to d/c'ing a diuretic.  She has been increasing her exercise to 30-60+ minutes daily.  She will continue to workout to help with weight loss.         Other      A1C <8.0            8/14/17:  Gi would like to reduce her a1c <8.0 to prevent future complications.  Her insulin doses are being titrated every 2 weeks to accomplish this.        Medication            8/14/17: Gi will take her lantus nightly and novolog two times daily.  8/28/17:  Goal met and continuing.        Monitor            Patient will test 2-3 times per day.  - Once in the morning and once before and after a meal.  8/14/17: Met and continuing at our appointment.    8/28/17: Met and continuing.  Asked her to check before breakfast, dinner, & bedtime.            Follow up:   Primary care visit  CDE (certified diabetic educator)      Education:     Monitoring   Meter (per above goals): Assessed and Discussed  Monitoring: Assessed, Discussed and Literature provided  BG goals: Assessed and Discussed    Nutrition Management  Nutrition Management: Assessed and Discussed  Weight: Assessed and Discussed  Portions/Balance: Assessed and Discussed  Carb ID/Count: Assessed and Literature provided  Label Reading: Assessed and Discussed  Heart Healthy Fats: Not addressed  Menu Planning: Assessed and Discussed  Dining Out: Assessed and Discussed  Physical  Activity: Assessed and Discussed  Medications: Assessed and Discussed  Orals: Not addressed  Injected Medications: Assessed and Discussed   Storage/Exp:Assessed and Discussed   Site Rotation: Assessed and Discussed   Sites Assessed: yes    Diabetes Disease Process: Assessed and Discussed    Acute Complications: Prevent, Detect, Treat:  Hypoglycemia: Assessed and Discussed  Hyperglycemia: Assessed and Discussed  Sick Days: Not addressed  Driving: Not addressed    Chronic Complications  Foot Care:Assessed and Discussed  Skin Care: Not addressed  Eye: Assessed and Discussed  ABC: Assessed and Discussed  Teeth:Not addressed  Goal Setting and Problem Solving: Assessed and Discussed  Barriers: Assessed and Discussed  Psychosocial Adjustments: Assessed and Discussed      Time spent with the patient: 60 minutes for diabetes education and counseling.   Previous Education: yes  Visit Type:DSMT  Hours Remaining: DSMT 6 and MNT 3      Chin Mckeon  10/17/2017

## 2021-06-13 NOTE — PROGRESS NOTES
ASSESSMENT and PLAN:  1. HTN (hypertension)  Suboptimal control.  We reviewed her hx with gout and HTN.  In august, I stopped her thiazide due to sx of gout during the prior year and an elevated uric acid level.  We've lost control of her BP since that time.  We discussed her gout sx, which she thinks are mild.  She's open to going back to hctz and then if gout recurs, look for an alt med.  Amlodipine might be an option.  I hesitate to use spironolactone.  Hydralazine might be an option too.  - hydroCHLOROthiazide (HYDRODIURIL) 25 MG tablet; Take 1 tablet (25 mg total) by mouth daily.  Dispense: 90 tablet; Refill: 3  - Basic Metabolic Panel      Medications Discontinued During This Encounter   Medication Reason     furosemide (LASIX) 20 MG tablet        No Follow-up on file.    Patient Instructions   Stop the lasix.    We'll go back to the hydrochlorothiazide for blood pressure.  If you get more toe pain, please let me know .  There are other medications we could try now that the swelling is better.    Take care!      CHIEF COMPLAINT:  Chief Complaint   Patient presents with     Hypertension     Follow up        HISTORY OF PRESENT ILLNESS:  Gi Bailey is a 77 y.o. female  presenting to the clinic today for follow up of her HTN.   Her BP was elevated at her last visit w/ me on 9/13/17.  We added 20mg daily of lasix.  Her weight is down three pounds from last week.  She feels fine.  She checked her BP at [facility], but that was prior to starting lasix.  Her SBP was 160.     TOBACCO USE:  History   Smoking Status     Never Smoker   Smokeless Tobacco     Never Used       VITALS:  Vitals:    09/19/17 1342 09/19/17 1415   BP: 164/80 146/74   Pulse: 72    Weight: (!) 237 lb 9.6 oz (107.8 kg)      Wt Readings from Last 3 Encounters:   09/19/17 (!) 237 lb 9.6 oz (107.8 kg)   09/13/17 (!) 240 lb (108.9 kg)   08/28/17 (!) 238 lb (108 kg)         PHYSICAL EXAM:  Constitutional:  Reveals an alert, pleasant upbeat, adult female.  "  Vitals:  Noted.       QUALITY MEASURES:  The following high BMI interventions were performed this visit: weight monitoring    MEDICATIONS:  Current Outpatient Prescriptions   Medication Sig Dispense Refill     aspirin 325 MG tablet Take 325 mg by mouth daily.       cholecalciferol, vitamin D3, (VITAMIN D3) 1,000 unit capsule Take 2,000 Units by mouth daily.       DOCOSAHEXANOIC ACID/EPA (FISH OIL ORAL) Take 1 capsule by mouth daily.       generic lancets (ONETOUCH ULTRASOFT) TEST 2 TO 3 TIMES DAILY 100 each 11     insulin aspart (NOVOLOG FLEXPEN) 100 unit/mL injection pen Inject 6-10 Units under the skin 2 (two) times a day. 15 mL 1     insulin glargine (LANTUS SOLOSTAR) 100 unit/mL (3 mL) pen Inject 50 Units under the skin at bedtime. As directed 45 mL 3     lisinopril (PRINIVIL,ZESTRIL) 40 MG tablet Take 1 tablet (40 mg total) by mouth daily. 30 tablet 11     metoprolol tartrate (LOPRESSOR) 25 MG tablet TAKE ONE TABLET BY MOUTH TWICE DAILY  60 tablet 11     MULTIVITAMIN ORAL Take 1 tablet by mouth daily.       ONETOUCH ULTRA TEST strips TEST FOUR TIMES DAILY 400 strip 1     pen needle, diabetic (BD ULTRA-FINE FIDELINA PEN NEEDLES) 32 gauge x 5/32\" Ndle Use 2 per day. 100 each 4     simvastatin (ZOCOR) 80 MG tablet TAKE 1 TABLET BY MOUTH EVERY NIGHT AT BEDTIME 30 tablet 11     hydroCHLOROthiazide (HYDRODIURIL) 25 MG tablet Take 1 tablet (25 mg total) by mouth daily. 90 tablet 3     mupirocin (BACTROBAN) 2 % ointment 2 (two) times a day. Apply sparingly to affected areas       No current facility-administered medications for this visit.      "

## 2021-06-16 PROBLEM — E66.01 MORBID OBESITY (H): Status: ACTIVE | Noted: 2018-08-02

## 2021-06-19 NOTE — PROGRESS NOTES
Assessment and Plan:     1. Hyperlipemia  She's done well on 80 mg of simvastatin daily.  Labs today.  - Comprehensive Metabolic Panel  - Lipid Cascade    2. Hyperparathyroidism (H)  Stable.  Repeat labs today  - Parathyroid Hormone Intact    3. Hypertension  Stable on current meds    4. Type 2 diabetes mellitus (H)  Uncertain degree of control, but sounds better than at her px last year.  - Glycosylated Hemoglobin A1c  - Microalbumin, Random Urine    5. Vitamin D deficiency  I'll repeat this today  - Vitamin D, Total (25-Hydroxy)    6. Morbid obesity (H)  She has HTN and DM2.  She's walking 3-5 days per week and has tried to make adjustments to her diet  I'll continue monitoring her weight.    7. Stage 2 chronic kidney disease  This has been stable.  Labs today.    8. Routine general medical examination at a health care facility  She's UTD on her immunizations and cancer screens.    The patient's current medical problems were reviewed.    The following high BMI interventions were performed this visit: weight monitoring  The following health maintenance schedule was reviewed with the patient and provided in printed form in the after visit summary:   Health Maintenance   Topic Date Due     DIABETES OPHTHALMOLOGY EXAM  02/28/1950     ZOSTER VACCINE  02/28/1999     DIABETES FOLLOW-UP  02/01/2018     DIABETES FOOT EXAM  08/01/2018     INFLUENZA VACCINE RULE BASED (1) 08/01/2018     DIABETES HEMOGLOBIN A1C  02/02/2019     DIABETES URINE MICROALBUMIN  08/02/2019     FALL RISK ASSESSMENT  08/02/2019     DXA SCAN  09/26/2019     ADVANCE DIRECTIVES DISCUSSED WITH PATIENT  05/25/2021     TD 18+ HE  10/12/2021     PNEUMOCOCCAL POLYSACCHARIDE VACCINE AGE 65 AND OVER  Completed     PNEUMOCOCCAL CONJUGATE VACCINE FOR ADULTS (PCV13 OR PREVNAR)  Completed        Subjective:   Chief Complaint: Gi Bailey is an 78 y.o. female here for an Annual Wellness visit.     HPI: In general, she is doing well.  She noted that her balance  was off.  Her brother had a similar issue and started taking B12.  She is started taking B12 and feels that it has helped her balance.  She is also doing some exercises at home where she works on walking a straight line.  She feels that that has been helpful as well and she seen some progress.    For her type 2 diabetes, her blood sugars range from 130-140.  She can have some readings of 120.  The lowest that she is 95.  She tends to be sensitive to potatoes and bread and spaghetti.  She was checking her blood sugars 3 times a day but given the stability she is now down to checking once in the morning.  She takes 64 units of Lantus before bedtime.  She takes 8 units of NovoLog with breakfast and dinner she will take 4-6 units of NovoLog.  Occasionally she will feel a funny sensation in her feet but that is rare.    She has osteoporosis and takes Fosamax on Mondays.  She has no difficulty with that.    Review of Systems:  Please see above.  The rest of the review of systems are negative for all systems.    Patient Care Team:  Gertrudis Guadarrama MD as PCP - General     Patient Active Problem List   Diagnosis     Osteopenia     Colon Cancer     Chronic kidney disease     Vitamin D Deficiency     Hyperlipemia     Hypertension     Type 2 diabetes mellitus (H)     Hyperparathyroidism (H)     Morbid obesity (H)     Past Medical History:   Diagnosis Date     Colon cancer (H) 1994      Past Surgical History:   Procedure Laterality Date     HYSTERECTOMY       OOPHORECTOMY       IA TOTAL ABDOM HYSTERECTOMY      Description: Hysterectomy;  Recorded: 01/22/2009;  Comments: 2000      Family History   Problem Relation Age of Onset     Heart disease Mother      Dementia Father      Colon cancer Brother       Social History     Social History     Marital status:      Spouse name: N/A     Number of children: N/A     Years of education: N/A     Occupational History     Not on file.     Social History Main Topics     Smoking  "status: Never Smoker     Smokeless tobacco: Never Used     Alcohol use No     Drug use: No     Sexual activity: Not on file     Other Topics Concern     Not on file     Social History Narrative      Current Outpatient Prescriptions   Medication Sig Dispense Refill     alendronate (FOSAMAX) 70 MG tablet Take 1 tablet (70 mg total) by mouth every 7 days. morning on an empty stomach with a full glass of water 30 minutes before food 12 tablet 3     aspirin 325 MG tablet Take 325 mg by mouth daily.       cholecalciferol, vitamin D3, (VITAMIN D3) 1,000 unit capsule Take 2,000 Units by mouth daily.       cyanocobalamin, vitamin B-12, (VITAMIN B-12 ORAL) Take by mouth daily.       DOCOSAHEXANOIC ACID/EPA (FISH OIL ORAL) Take 1 capsule by mouth daily.       generic lancets (ONETOUCH ULTRASOFT) TEST 2 TO 3 TIMES DAILY 100 each 11     hydroCHLOROthiazide (HYDRODIURIL) 25 MG tablet Take 1 tablet (25 mg total) by mouth daily. 90 tablet 3     insulin aspart U-100 (NOVOLOG FLEXPEN U-100 INSULIN) 100 unit/mL injection pen Inject 6-10 Units under the skin 2 (two) times a day. 15 mL 1     LANTUS SOLOSTAR U-100 INSULIN 100 unit/mL (3 mL) pen INJECT 50 UNITS UNDER THE SKIN AT BEDTIME AS DIRECTED 45 mL 0     lisinopril (PRINIVIL,ZESTRIL) 40 MG tablet Take 1 tablet (40 mg total) by mouth daily. 30 tablet 11     metoprolol tartrate (LOPRESSOR) 25 MG tablet TAKE ONE TABLET BY MOUTH TWICE DAILY  60 tablet 11     MULTIVITAMIN ORAL Take 1 tablet by mouth daily.       mupirocin (BACTROBAN) 2 % ointment 2 (two) times a day. Apply sparingly to affected areas       ONETOUCH ULTRA TEST strips TEST FOUR TIMES DAILY 400 strip 1     pen needle, diabetic (BD ULTRA-FINE FIDELINA PEN NEEDLES) 32 gauge x 5/32\" Ndle Use 2 per day. 100 each 4     simvastatin (ZOCOR) 80 MG tablet TAKE 1 TABLET BY MOUTH EVERY NIGHT AT BEDTIME 30 tablet 11     No current facility-administered medications for this visit.       Objective:   Vital Signs:   Visit Vitals     /84 " "    Pulse (!) 57     Temp 97.9  F (36.6  C)     Resp 14     Ht 5' 1.5\" (1.562 m)     Wt (!) 237 lb 3.2 oz (107.6 kg)     SpO2 98%     BMI 44.09 kg/m2        VisionScreening:  No exam data present     PHYSICAL EXAM:  Constitutional:  Reveals an alert, pleasant adult female.   Vitals:  Noted.   Ears: TM's normal bilaterally   Eyes: PERRL, conjunctiva/corneas clear, EOM's intact   Throat: Lips, mucosa, and tongue normal; teeth and gums normal   Neck: Normal ROM, no carotid bruits, no thyromegaly   Lungs: Clear to auscultation bilaterally, respirations unlabored.   Breast exam:  Normal skin overlying her breasts bilaterally no discrete nodule is palpable in the axilla bilaterally  Heart: Regular rate and rhythm, S1 and S2 normal, no murmur, rub, or gallop,   Abdomen: Soft, non-tender, bowel sounds active, no masses, no organomegaly   Extremities: Extremities normal, atraumatic, no cyanosis or edema   Pelvic:Not examined  Skin: Skin color, texture, turgor normal, no rashes or lesions   Neurologic: Normal       Assessment Results 8/2/2018   Activities of Daily Living No help needed   Instrumental Activities of Daily Living No help needed   Mini Cog Total Score 4   Some recent data might be hidden     A Mini-Cog score of 0-2 suggests the possibility of dementia, score of 3-5 suggests no dementia    Identified Health Risks:     The patient was provided with suggestions to help her develop a healthy lifestyle.   Information regarding advance directives (living [place]), including where she can download the appropriate form, was provided to the patient via the AVS.       "

## 2021-06-24 NOTE — TELEPHONE ENCOUNTER
RN cannot approve Refill Request    RN can NOT refill this medication Protocol failed and NO refill given.       Alejandra Hunter, [facility] Triage/Med Refill 2/18/2019    Requested Prescriptions   Pending Prescriptions Disp Refills     LANTUS SOLOSTAR U-100 INSULIN 100 unit/mL (3 mL) pen [Pharmacy Med Name: Lantus SoloStar Subcutaneous Solution Pen-injector 100 UNIT/ML] 45 mL 4     Sig: INJECT 50 UNITS UNDER THE SKIN AT BEDTIME AS DIRECTED    Insulin/GLP-1 Refill Protocol Failed - 2/15/2019 10:27 AM       Failed - Visit with PCP or prescribing provider visit in last 6 months    Last office visit with prescriber/PCP: Visit date not found OR same dept: Visit date not found OR same specialty: 10/4/2017 Madisyn Dutta MD Last physical: Visit date not found Last MTM visit: Visit date not found     Next appt within 3 mo: Visit date not found  Next physical within 3 mo: Visit date not found  Prescriber OR PCP: Gertrudis Guadarrama MD  Last diagnosis associated with med order: 1. DM (diabetes mellitus) (H)  - LANTUS SOLOSTAR U-100 INSULIN 100 unit/mL (3 mL) pen [Pharmacy Med Name: Lantus SoloStar Subcutaneous Solution Pen-injector 100 UNIT/ML]; INJECT 50 UNITS UNDER THE SKIN AT BEDTIME AS DIRECTED  Dispense: 45 mL; Refill: 0    If protocol passes may refill for 6 months if within 3 months of last provider visit (or a total of 9 months).             Failed - A1C in last 6 months    Hemoglobin A1c   Date Value Ref Range Status   08/02/2018 6.8 (H) 3.5 - 6.0 % Final              Passed - Microalbumin in last year    Microalbumin, Random Urine   Date Value Ref Range Status   08/02/2018 1.02 0.00 - 1.99 mg/dL Final                 Passed - Blood pressure in last year    BP Readings from Last 1 Encounters:   08/02/18 132/84            Passed - Creatinine done in last year    Creatinine   Date Value Ref Range Status   08/02/2018 1.70 (H) 0.60 - 1.10 mg/dL Final

## 2021-07-21 ENCOUNTER — RECORDS - HEALTHEAST (OUTPATIENT)
Dept: ADMINISTRATIVE | Facility: CLINIC | Age: 81
End: 2021-07-21

## 2023-07-15 ENCOUNTER — NURSE TRIAGE (OUTPATIENT)
Dept: NURSING | Facility: CLINIC | Age: 83
End: 2023-07-15
Payer: COMMERCIAL

## 2023-07-15 NOTE — TELEPHONE ENCOUNTER
Explained the daughter she does not need to call ED before going in for chest pain.  Aleja Rodriguez, RN on 7/15/2023 at 4:49 PM      Reason for Disposition    Information only question and nurse able to answer    Protocols used: INFORMATION ONLY CALL - NO TRIAGE-A-[place]

## 2023-07-19 ENCOUNTER — APPOINTMENT (OUTPATIENT)
Dept: CT IMAGING | Facility: CLINIC | Age: 83
End: 2023-07-19
Attending: EMERGENCY MEDICINE
Payer: COMMERCIAL

## 2023-07-19 ENCOUNTER — HOSPITAL ENCOUNTER (OUTPATIENT)
Facility: CLINIC | Age: 83
Setting detail: OBSERVATION
Discharge: HOME | End: 2023-07-21
Attending: EMERGENCY MEDICINE | Admitting: STUDENT IN AN ORGANIZED HEALTH CARE EDUCATION/TRAINING PROGRAM
Payer: COMMERCIAL

## 2023-07-19 DIAGNOSIS — Z79.4 TYPE 2 DIABETES MELLITUS WITHOUT COMPLICATION, WITH LONG-TERM CURRENT USE OF INSULIN (H): ICD-10-CM

## 2023-07-19 DIAGNOSIS — R19.7 DIARRHEA, UNSPECIFIED TYPE: Primary | ICD-10-CM

## 2023-07-19 DIAGNOSIS — N17.9 ACUTE KIDNEY INJURY SUPERIMPOSED ON CKD (H): ICD-10-CM

## 2023-07-19 DIAGNOSIS — E11.9 TYPE 2 DIABETES MELLITUS WITHOUT COMPLICATION, WITH LONG-TERM CURRENT USE OF INSULIN (H): ICD-10-CM

## 2023-07-19 DIAGNOSIS — N18.9 ACUTE KIDNEY INJURY SUPERIMPOSED ON CKD (H): ICD-10-CM

## 2023-07-19 LAB
ALBUMIN UR-MCNC: 50 MG/DL
ANION GAP SERPL CALCULATED.3IONS-SCNC: 16 MMOL/L (ref 7–15)
APPEARANCE UR: CLEAR
BASOPHILS # BLD AUTO: 0 10E3/UL (ref 0–0.2)
BASOPHILS NFR BLD AUTO: 0 %
BILIRUB UR QL STRIP: NEGATIVE
BUN SERPL-MCNC: 78.5 MG/DL (ref 8–23)
CALCIUM SERPL-MCNC: 10.2 MG/DL (ref 8.8–10.2)
CHLORIDE SERPL-SCNC: 97 MMOL/L (ref 98–107)
COLOR UR AUTO: YELLOW
CREAT SERPL-MCNC: 3.71 MG/DL (ref 0.51–0.95)
DEPRECATED HCO3 PLAS-SCNC: 20 MMOL/L (ref 22–29)
EOSINOPHIL # BLD AUTO: 0.3 10E3/UL (ref 0–0.7)
EOSINOPHIL NFR BLD AUTO: 3 %
ERYTHROCYTE [DISTWIDTH] IN BLOOD BY AUTOMATED COUNT: 12.8 % (ref 10–15)
GFR SERPL CREATININE-BSD FRML MDRD: 11 ML/MIN/1.73M2
GLUCOSE BLDC GLUCOMTR-MCNC: 182 MG/DL (ref 70–99)
GLUCOSE SERPL-MCNC: 232 MG/DL (ref 70–99)
GLUCOSE UR STRIP-MCNC: 50 MG/DL
HBA1C MFR BLD: 7.3 %
HCT VFR BLD AUTO: 34 % (ref 35–47)
HGB BLD-MCNC: 11.5 G/DL (ref 11.7–15.7)
HGB UR QL STRIP: NEGATIVE
IMM GRANULOCYTES # BLD: 0.1 10E3/UL
IMM GRANULOCYTES NFR BLD: 1 %
KETONES UR STRIP-MCNC: NEGATIVE MG/DL
LEUKOCYTE ESTERASE UR QL STRIP: NEGATIVE
LYMPHOCYTES # BLD AUTO: 1.8 10E3/UL (ref 0.8–5.3)
LYMPHOCYTES NFR BLD AUTO: 18 %
MCH RBC QN AUTO: 32.2 PG (ref 26.5–33)
MCHC RBC AUTO-ENTMCNC: 33.8 G/DL (ref 31.5–36.5)
MCV RBC AUTO: 95 FL (ref 78–100)
MONOCYTES # BLD AUTO: 0.9 10E3/UL (ref 0–1.3)
MONOCYTES NFR BLD AUTO: 9 %
MUCOUS THREADS #/AREA URNS LPF: PRESENT /LPF
NEUTROPHILS # BLD AUTO: 7 10E3/UL (ref 1.6–8.3)
NEUTROPHILS NFR BLD AUTO: 69 %
NITRATE UR QL: NEGATIVE
NRBC # BLD AUTO: 0 10E3/UL
NRBC BLD AUTO-RTO: 0 /100
PH UR STRIP: 5 [PH] (ref 5–7)
PLATELET # BLD AUTO: 275 10E3/UL (ref 150–450)
POTASSIUM SERPL-SCNC: 3.9 MMOL/L (ref 3.4–5.3)
RBC # BLD AUTO: 3.57 10E6/UL (ref 3.8–5.2)
RBC URINE: 1 /HPF
SODIUM SERPL-SCNC: 133 MMOL/L (ref 136–145)
SP GR UR STRIP: 1.01 (ref 1–1.03)
SQUAMOUS EPITHELIAL: 2 /HPF
UROBILINOGEN UR STRIP-MCNC: NORMAL MG/DL
WBC # BLD AUTO: 10.1 10E3/UL (ref 4–11)
WBC URINE: 2 /HPF

## 2023-07-19 PROCEDURE — G0378 HOSPITAL OBSERVATION PER HR: HCPCS

## 2023-07-19 PROCEDURE — 80048 BASIC METABOLIC PNL TOTAL CA: CPT | Performed by: EMERGENCY MEDICINE

## 2023-07-19 PROCEDURE — 36415 COLL VENOUS BLD VENIPUNCTURE: CPT | Performed by: EMERGENCY MEDICINE

## 2023-07-19 PROCEDURE — 99285 EMERGENCY DEPT VISIT HI MDM: CPT | Mod: 25

## 2023-07-19 PROCEDURE — 258N000003 HC RX IP 258 OP 636: Performed by: INTERNAL MEDICINE

## 2023-07-19 PROCEDURE — 96361 HYDRATE IV INFUSION ADD-ON: CPT

## 2023-07-19 PROCEDURE — 250N000012 HC RX MED GY IP 250 OP 636 PS 637: Performed by: INTERNAL MEDICINE

## 2023-07-19 PROCEDURE — 81001 URINALYSIS AUTO W/SCOPE: CPT | Performed by: EMERGENCY MEDICINE

## 2023-07-19 PROCEDURE — 258N000003 HC RX IP 258 OP 636: Performed by: EMERGENCY MEDICINE

## 2023-07-19 PROCEDURE — 250N000013 HC RX MED GY IP 250 OP 250 PS 637: Performed by: INTERNAL MEDICINE

## 2023-07-19 PROCEDURE — 85004 AUTOMATED DIFF WBC COUNT: CPT | Performed by: EMERGENCY MEDICINE

## 2023-07-19 PROCEDURE — 82962 GLUCOSE BLOOD TEST: CPT

## 2023-07-19 PROCEDURE — 83036 HEMOGLOBIN GLYCOSYLATED A1C: CPT | Performed by: INTERNAL MEDICINE

## 2023-07-19 PROCEDURE — 74176 CT ABD & PELVIS W/O CONTRAST: CPT

## 2023-07-19 PROCEDURE — 96360 HYDRATION IV INFUSION INIT: CPT

## 2023-07-19 PROCEDURE — 99222 1ST HOSP IP/OBS MODERATE 55: CPT | Performed by: INTERNAL MEDICINE

## 2023-07-19 RX ORDER — ALLOPURINOL 100 MG/1
100 TABLET ORAL DAILY
COMMUNITY

## 2023-07-19 RX ORDER — NIFEDIPINE 90 MG/1
90 TABLET, FILM COATED, EXTENDED RELEASE ORAL DAILY
COMMUNITY

## 2023-07-19 RX ORDER — ALENDRONATE SODIUM 70 MG/1
70 TABLET ORAL
COMMUNITY

## 2023-07-19 RX ORDER — HYDRALAZINE HYDROCHLORIDE 10 MG/1
40 TABLET, FILM COATED ORAL 2 TIMES DAILY
COMMUNITY

## 2023-07-19 RX ORDER — SIMVASTATIN 40 MG
40 TABLET ORAL AT BEDTIME
COMMUNITY

## 2023-07-19 RX ORDER — CIPROFLOXACIN 500 MG/1
500 TABLET, FILM COATED ORAL
Status: DISCONTINUED | OUTPATIENT
Start: 2023-07-20 | End: 2023-07-21 | Stop reason: HOSPADM

## 2023-07-19 RX ORDER — ACETAMINOPHEN 325 MG/1
650 TABLET ORAL EVERY 6 HOURS PRN
Status: DISCONTINUED | OUTPATIENT
Start: 2023-07-19 | End: 2023-07-21 | Stop reason: HOSPADM

## 2023-07-19 RX ORDER — HYDRALAZINE HYDROCHLORIDE 10 MG/1
20 TABLET, FILM COATED ORAL 2 TIMES DAILY
Status: DISCONTINUED | OUTPATIENT
Start: 2023-07-19 | End: 2023-07-21

## 2023-07-19 RX ORDER — ONDANSETRON 2 MG/ML
4 INJECTION INTRAMUSCULAR; INTRAVENOUS EVERY 6 HOURS PRN
Status: DISCONTINUED | OUTPATIENT
Start: 2023-07-19 | End: 2023-07-21 | Stop reason: HOSPADM

## 2023-07-19 RX ORDER — AMOXICILLIN 250 MG
1 CAPSULE ORAL 2 TIMES DAILY PRN
Status: DISCONTINUED | OUTPATIENT
Start: 2023-07-19 | End: 2023-07-21 | Stop reason: HOSPADM

## 2023-07-19 RX ORDER — INSULIN GLARGINE 100 [IU]/ML
63 INJECTION, SOLUTION SUBCUTANEOUS AT BEDTIME
Status: ON HOLD | COMMUNITY
End: 2023-07-21

## 2023-07-19 RX ORDER — ACETAMINOPHEN 650 MG/1
650 SUPPOSITORY RECTAL EVERY 6 HOURS PRN
Status: DISCONTINUED | OUTPATIENT
Start: 2023-07-19 | End: 2023-07-21 | Stop reason: HOSPADM

## 2023-07-19 RX ORDER — CARVEDILOL 12.5 MG/1
12.5 TABLET ORAL 2 TIMES DAILY WITH MEALS
Status: DISCONTINUED | OUTPATIENT
Start: 2023-07-19 | End: 2023-07-21 | Stop reason: HOSPADM

## 2023-07-19 RX ORDER — ONDANSETRON 4 MG/1
4 TABLET, ORALLY DISINTEGRATING ORAL EVERY 6 HOURS PRN
Status: DISCONTINUED | OUTPATIENT
Start: 2023-07-19 | End: 2023-07-21 | Stop reason: HOSPADM

## 2023-07-19 RX ORDER — AMOXICILLIN 250 MG
2 CAPSULE ORAL 2 TIMES DAILY PRN
Status: DISCONTINUED | OUTPATIENT
Start: 2023-07-19 | End: 2023-07-21 | Stop reason: HOSPADM

## 2023-07-19 RX ORDER — TORSEMIDE 20 MG/1
60 TABLET ORAL DAILY
COMMUNITY

## 2023-07-19 RX ORDER — ISOSORBIDE MONONITRATE 30 MG/1
30 TABLET, EXTENDED RELEASE ORAL DAILY
Status: DISCONTINUED | OUTPATIENT
Start: 2023-07-20 | End: 2023-07-21 | Stop reason: HOSPADM

## 2023-07-19 RX ORDER — CARVEDILOL 12.5 MG/1
12.5 TABLET ORAL 2 TIMES DAILY WITH MEALS
COMMUNITY

## 2023-07-19 RX ORDER — NICOTINE POLACRILEX 4 MG
15-30 LOZENGE BUCCAL
Status: DISCONTINUED | OUTPATIENT
Start: 2023-07-19 | End: 2023-07-21 | Stop reason: HOSPADM

## 2023-07-19 RX ORDER — METRONIDAZOLE 500 MG/1
500 TABLET ORAL 2 TIMES DAILY
Status: DISCONTINUED | OUTPATIENT
Start: 2023-07-19 | End: 2023-07-21 | Stop reason: HOSPADM

## 2023-07-19 RX ORDER — DEXTROSE MONOHYDRATE 25 G/50ML
25-50 INJECTION, SOLUTION INTRAVENOUS
Status: DISCONTINUED | OUTPATIENT
Start: 2023-07-19 | End: 2023-07-21 | Stop reason: HOSPADM

## 2023-07-19 RX ORDER — ISOSORBIDE MONONITRATE 30 MG/1
30 TABLET, EXTENDED RELEASE ORAL DAILY
COMMUNITY

## 2023-07-19 RX ADMIN — METRONIDAZOLE 500 MG: 500 TABLET ORAL at 21:26

## 2023-07-19 RX ADMIN — CARVEDILOL 12.5 MG: 12.5 TABLET, FILM COATED ORAL at 21:27

## 2023-07-19 RX ADMIN — SODIUM CHLORIDE 1000 ML: 9 INJECTION, SOLUTION INTRAVENOUS at 21:25

## 2023-07-19 RX ADMIN — INSULIN GLARGINE 40 UNITS: 100 INJECTION, SOLUTION SUBCUTANEOUS at 22:38

## 2023-07-19 RX ADMIN — SODIUM CHLORIDE 500 ML: 9 INJECTION, SOLUTION INTRAVENOUS at 16:21

## 2023-07-19 RX ADMIN — HYDRALAZINE HYDROCHLORIDE 20 MG: 10 TABLET, FILM COATED ORAL at 21:26

## 2023-07-19 ASSESSMENT — ACTIVITIES OF DAILY LIVING (ADL)
ADLS_ACUITY_SCORE: 35
ADLS_ACUITY_SCORE: 20
ADLS_ACUITY_SCORE: 35
ADLS_ACUITY_SCORE: 20

## 2023-07-19 NOTE — ED PROVIDER NOTES
"  History     Chief Complaint:  Abnormal Labs       HPI   Gi Bailey is a 83 year old female with history of type II diabetes, hypertension, and stage 4 CKD who presents with with abnormal labs that the patient received from blood work that she had done at [facility] after her gallbladder was drained and a tube was placed between the dates of 7/15-7/18. Her kidney levels were high and she reports being on two new medications. The patient reports not having the gallbladder removed to a large hernia she has. She denies change in appetite, pain since procedure, or dysuria.    Independent Historian:   None - Patient Only    Review of External Notes:   None     Medications:    Zyloprim  Coreg  Sensipar  Cipro  Colace  Apresoline  Imdur  Flagyl  Adalat  Demadex  Aspirin 325 mg  Vitamin D  Vitamin B-12  Hydrodiuril  Prinivil  Lopressor  Zocor    Past Medical History:    CKD stage 4  USAMA  Osteopenia  Hypertension  Hyperlipidemia  Type II diabetes  Vitamin D deficiency  Morbid obesity  Colon cancer  Hyperparathyroidism    Past Surgical History:    Hysterectomy  Oophorectomy  Total abdominal hysterectomy  Colectomy  IR percutaneous tube change    Physical Exam     Patient Vitals for the past 24 hrs:   BP Temp Temp src Pulse Resp SpO2 Height Weight   07/19/23 1703 (!) 142/59 -- -- -- -- 98 % -- --   07/19/23 1457 (!) 178/63 97.5  F (36.4  C) Oral 55 20 99 % 1.549 m (5' 1\") 96.6 kg (213 lb)        Physical Exam  Vitals reviewed.   Constitutional:       Appearance: She is obese.   HENT:      Head: Normocephalic.   Eyes:      General: No scleral icterus.     Conjunctiva/sclera: Conjunctivae normal.      Pupils: Pupils are equal, round, and reactive to light.   Cardiovascular:      Rate and Rhythm: Normal rate.   Pulmonary:      Effort: No respiratory distress.   Abdominal:      Comments: Cholecystostomy tube in the midline draining bile.   Musculoskeletal:         General: Normal range of motion.   Skin:     General: " Skin is warm.      Capillary Refill: Capillary refill takes less than 2 seconds.   Neurological:      General: No focal deficit present.      Mental Status: She is alert.   Psychiatric:         Mood and Affect: Mood normal.           Emergency Department Course     Imaging:  Abd/pelvis CT no contrast - Stone Protocol   Final Result   IMPRESSION:    1.  Layering calcified gallstones. Interval placement of a cholecystostomy tube with decreased gallbladder distention. There remains gallbladder wall thickening with mild surrounding inflammation. Small amount of air now seen anterior to the gallbladder    likely due to placement of the tube.      2.  There are multiple abdominal hernias seen. Previously containing multiple loops of colon and small bowel. There is new distention of the right hemicolon involving the cecum, ascending colon and extending to the level of the splenic flexure. Fluid    level in the involved segment of colon. Question segmental colitis of the segments with associated ileus. Follow-up is recommended.            Report per radiology    Laboratory:  Labs Ordered and Resulted from Time of ED Arrival to Time of ED Departure   BASIC METABOLIC PANEL - Abnormal       Result Value    Sodium 133 (*)     Potassium 3.9      Chloride 97 (*)     Carbon Dioxide (CO2) 20 (*)     Anion Gap 16 (*)     Urea Nitrogen 78.5 (*)     Creatinine 3.71 (*)     Calcium 10.2      Glucose 232 (*)     GFR Estimate 11 (*)    CBC WITH PLATELETS AND DIFFERENTIAL - Abnormal    WBC Count 10.1      RBC Count 3.57 (*)     Hemoglobin 11.5 (*)     Hematocrit 34.0 (*)     MCV 95      MCH 32.2      MCHC 33.8      RDW 12.8      Platelet Count 275      % Neutrophils 69      % Lymphocytes 18      % Monocytes 9      % Eosinophils 3      % Basophils 0      % Immature Granulocytes 1      NRBCs per 100 WBC 0      Absolute Neutrophils 7.0      Absolute Lymphocytes 1.8      Absolute Monocytes 0.9      Absolute Eosinophils 0.3      Absolute  Basophils 0.0      Absolute Immature Granulocytes 0.1      Absolute NRBCs 0.0     ROUTINE UA WITH MICROSCOPIC REFLEX TO CULTURE      Emergency Department Course & Assessments:         Interventions:  Medications   0.9% sodium chloride BOLUS (500 mLs Intravenous $New Bag 7/19/23 1624)        Assessments:  1555 I obtained history and examined the patient as noted above.  1752 I rechecked and updated the patient. I discussed her CT result and potential admission to the hospital.  1807 I spoke with hospitalist Dr. Castellanos about the patient's presentation, findings, and plan of care.     Independent Interpretation (X-rays, CTs, rhythm strip):  None    Consultations/Discussion of Management or Tests:  None        Social Determinants of Health affecting care:   None    Disposition:  The patient was admitted to the hospital under the care of Dr. Castellanos.     Impression & Plan    CMS Diagnoses: None    Medical Decision Making:  Patient presents with abnormal postop labs.  Patient had recent cholecystostomy tube placed.  Lab work shows bump in BUN and creatinine.  Highly likely due to dehydration due to recent procedures and hospital stay.  Care was discussed with the hospitalist due to significant bump in creatinine and recommend admission for slow IV fluids and reassessment.  Care was discussed with the hospitalist and was admitted initially on observation may move to inpatient if ongoing concerns for MYESHA.    Diagnosis:    ICD-10-CM    1. Diarrhea, unspecified type  R19.7 saccharomyces boulardii (FLORASTOR) 250 MG capsule      2. Acute kidney injury superimposed on CKD (H)  N17.9     N18.9       3. Type 2 diabetes mellitus without complication, with long-term current use of insulin (H)  E11.9 insulin aspart (NOVOLOG PEN) 100 UNIT/ML pen    Z79.4            Discharge Medications:  Discharge Medication List as of 7/21/2023  2:36 PM      START taking these medications    Details   ciprofloxacin (CIPRO) 500 MG tablet Take 1 tablet  (500 mg) by mouth every 24 hours, Historical      metroNIDAZOLE (FLAGYL) 500 MG tablet Take 1 tablet (500 mg) by mouth 2 times daily, Historical      saccharomyces boulardii (FLORASTOR) 250 MG capsule Take 1 capsule (250 mg) by mouth 2 times daily for 10 days, Disp-20 capsule, R-0, E-Prescribe            Scribe Disclosure:  I, Jeffery Giles, am serving as a scribe at 5:35 PM on 7/19/2023 to document services personally performed by Ezequiel Mcadams MD based on my observations and the provider's statements to me.      Ezequiel Mcadams MD  07/22/23 4546

## 2023-07-19 NOTE — ED NOTES
[facility]  ED Nurse Handoff Report    ED Chief complaint: Abnormal Labs  . ED Diagnosis:   Final diagnoses:   None       Allergies:   Allergies   Allergen Reactions     Penicillins Rash     Pioglitazone Swelling       Code Status: Full Code    Activity level - Baseline/Home:  independent.  Activity Level - Current:   assist of 1.   Lift room needed: No.   Bariatric: No   Needed: No   Isolation: No.   Infection: Not Applicable.     Respiratory status: Room air    Vital Signs (within 30 minutes):   Vitals:    07/19/23 1703 07/19/23 1759 07/19/23 1809 07/19/23 1819   BP: (!) 142/59  (!) 171/77 (!) 163/72   Pulse:       Resp:       Temp:       TempSrc:       SpO2: 98% 96%  98%   Weight:       Height:           Cardiac Rhythm:  ,      Pain level:    Patient confused: No.   Patient Falls Risk: nonskid shoes/slippers when out of bed, patient and family education, and assistive device/personal items within reach.   Elimination Status: Has voided     Patient Report - Initial Complaint: Pt arrives to the ED due to having elevated creatinine of 4.4 Pt had recent hospitalization for gallbladder surgery. Denies any difficulty urinating. Pt states known kidney disease.   Focused Assessment: Gastrointestinal Gastrointestinal WDL: .WDL except; GI symptoms  GI Signs/Symptoms: abdominal discomfort; diarrhea      Abnormal Results:   Labs Ordered and Resulted from Time of ED Arrival to Time of ED Departure   BASIC METABOLIC PANEL - Abnormal       Result Value    Sodium 133 (*)     Potassium 3.9      Chloride 97 (*)     Carbon Dioxide (CO2) 20 (*)     Anion Gap 16 (*)     Urea Nitrogen 78.5 (*)     Creatinine 3.71 (*)     Calcium 10.2      Glucose 232 (*)     GFR Estimate 11 (*)    CBC WITH PLATELETS AND DIFFERENTIAL - Abnormal    WBC Count 10.1      RBC Count 3.57 (*)     Hemoglobin 11.5 (*)     Hematocrit 34.0 (*)     MCV 95      MCH 32.2      MCHC 33.8      RDW 12.8      Platelet Count 275      %  Neutrophils 69      % Lymphocytes 18      % Monocytes 9      % Eosinophils 3      % Basophils 0      % Immature Granulocytes 1      NRBCs per 100 WBC 0      Absolute Neutrophils 7.0      Absolute Lymphocytes 1.8      Absolute Monocytes 0.9      Absolute Eosinophils 0.3      Absolute Basophils 0.0      Absolute Immature Granulocytes 0.1      Absolute NRBCs 0.0     ROUTINE UA WITH MICROSCOPIC REFLEX TO CULTURE        Abd/pelvis CT no contrast - Stone Protocol   Final Result   IMPRESSION:    1.  Layering calcified gallstones. Interval placement of a cholecystostomy tube with decreased gallbladder distention. There remains gallbladder wall thickening with mild surrounding inflammation. Small amount of air now seen anterior to the gallbladder    likely due to placement of the tube.      2.  There are multiple abdominal hernias seen. Previously containing multiple loops of colon and small bowel. There is new distention of the right hemicolon involving the cecum, ascending colon and extending to the level of the splenic flexure. Fluid    level in the involved segment of colon. Question segmental colitis of the segments with associated ileus. Follow-up is recommended.             Treatments provided: see MAR  Family Comments: son at bedside  OBS brochure/video discussed/provided to patient:  No/ contacted RN brochure given.  ED Medications:   Medications   0.9% sodium chloride BOLUS (0 mLs Intravenous Stopped 7/19/23 0442)       Drips infusing:  No  For the majority of the shift this patient was Green.   Interventions performed were n/a.    Sepsis treatment initiated: No    Cares/treatment/interventions/medications to be completed following ED care: see orders    ED Nurse Name: Joanne Bourgeois RN  6:21 PM  RECEIVING UNIT ED HANDOFF REVIEW    Above ED Nurse Handoff Report was reviewed: Yes  Reviewed by: Rohini Ordonez RN on July 19, 2023 at 7:43 PM

## 2023-07-19 NOTE — H&P
[facility]  Hospitalist Admission Note  Name: Gi Bailey    MRN: [id number]  YOB: 1940    Age: 83 year old  Date of admission: 7/19/2023  Primary care provider: [facility]-    Chief Complaint: Acute on chronic kidney disease    Gi Bailey is a 83 year old female with PMH including stage IV CKD, obesity, insulin-dependent type 2 diabetes, hypertension and recent admission at [facility] for cholecystitis during which time she underwent percutaneous cholecystostomy tube and was discharged 3 days ago on 7/16 who presents with abnormal labs.  The day of discharge her creatinine was up very slightly from her baseline at 3.26.  Apparently baseline is closer to 2.8-3.  The plan was for short-term follow-up to recheck this.  Also, her diuretic was held upon discharge pending recheck.  Today she had outpatient labs done with creatinine returning at 4.4.  She was referred to the ER.      Here in the ER, evaluation was revealing of stable hemodynamics.  Lab work-up here showed sodium of 133, BUN of 78.5, creatinine of 3.71 and anion gap of 16.  Glucose was elevated at 232.  She was given 1 L of IV fluids and will be admitted for further care for acute kidney injury.    Assessment and Plan:   1. Acute on chronic kidney disease: At and already trending down to 3.7 compared to 4.4 earlier today.  Baseline apparently is 2.8 or 3.  Suspect this is related to recent cholecystitis and associated symptoms.  -- Admit under observation status  -- We will give 1 more liter of normal saline over 10 hours  -- Recheck BMP in the morning  -- Anticipate if kidney functions improved she likely can discharge home after a.m. labs.  -- Avoid nephrotoxins    2.   Recent episode of cholecystitis treated with cholecystostomy tube: Discharged from [facility] on 7/16.  -- It sounds as though she is on Cipro and Flagyl, renally dosed.  -- Resume Cipro and Flagyl once  "doses are verified.  -- She does need to have the bag on her cholecystostomy tube changed.  Drainage appears to be dark bilious.    3.   Insulin-dependent type 2 diabetes: Normally takes 65 units of U-500 in the evening in addition to 8 units with breakfast, 2 units with lunch and 6 units with dinner  -- Resuming mealtime insulin at her usual dose  -- Given acute kidney injury will reduce evening long-acting insulin to 40 units and titrate as needed  -- High sliding scale insulin      4.   History of hypertension, ?  Chronic diastolic CHF:   --Holding off on her home diuretic for now given acute kidney injury and need for gentle hydration.  -- Resume her home Coreg, Imdur and hydralazine.    5.   Obesity: Complicates care.    6.  Mild hypercalcemia: Recheck in the morning.  Unclear to me if she is taking a vitamin D supplement.      DVT Prophylaxis: Ambulate every shift  Code Status: Full Code, discussed  Discharge Dispo: Admit under observation status.  Likely home tomorrow if kidney function better.    Clinically Significant Risk Factors Present on Admission           # Hypercalcemia: Highest Ca = 10.2 mg/dL in last 2 days, will monitor as appropriate      # Drug Induced Platelet Defect: home medication list includes an antiplatelet medication   # Hypertension: Noted on problem list      # Severe Obesity: Estimated body mass index is 40.25 kg/m  as calculated from the following:    Height as of this encounter: 1.549 m (5' 1\").    Weight as of this encounter: 96.6 kg (213 lb).                  History of Present Illness:  Gi Bailey is a 83 year old female with PMH including stage IV CKD, obesity, insulin-dependent type 2 diabetes, hypertension and recent admission at [facility] for cholecystitis during which time she underwent percutaneous cholecystostomy tube and was discharged 3 days ago on 7/16 who presents with abnormal labs.  The day of discharge her creatinine was up very slightly from her baseline " at 3.26.  Apparently baseline is closer to 2.8-3.  The plan was for short-term follow-up to recheck this.  Also, her diuretic was held upon discharge pending recheck.  Today she had outpatient labs done with creatinine returning at 4.4.  She was referred to the ER.    Otherwise she feels she is doing okay.  She feels slightly foggy but denies fevers, chills, chest pain, abdominal pain.  She has been mostly eating normally.           Past Medical History:  Insulin-dependent type 2 diabetes  Stage IV CKD  Obesity  Hypertension  Osteoporosis    Past Surgical History:  Past Surgical History:   Procedure Laterality Date     HYSTERECTOMY       OOPHORECTOMY       ZZC TOTAL ABDOM HYSTERECTOMY      Description: Hysterectomy;  Recorded: 01/22/2009;  Comments: 2000     Social History:  Social History     Tobacco Use     Smoking status: Never     Smokeless tobacco: Never   Substance Use Topics     Alcohol use: No     Social History     Social History Narrative     Not on file     Family History:  Family History   Problem Relation Age of Onset     Heart Disease Mother      Dementia Father      Colon Cancer Brother      Allergies:  Allergies   Allergen Reactions     Penicillins Rash     Pioglitazone Swelling     Medications:  No current facility-administered medications on file prior to encounter.  alendronate (FOSAMAX) 70 MG tablet, [ALENDRONATE (FOSAMAX) 70 MG TABLET] Take 1 tablet (70 mg total) by mouth every 7 days. morning on an empty stomach with a full glass of water 30 minutes before food  aspirin 325 MG tablet, [ASPIRIN 325 MG TABLET] Take 325 mg by mouth daily.  cholecalciferol, vitamin D3, (VITAMIN D3) 1,000 unit capsule, [CHOLECALCIFEROL, VITAMIN D3, (VITAMIN D3) 1,000 UNIT CAPSULE] Take 2,000 Units by mouth daily.  cyanocobalamin, vitamin B-12, (VITAMIN B-12 ORAL), [CYANOCOBALAMIN, VITAMIN B-12, (VITAMIN B-12 ORAL)] Take by mouth daily.  DOCOSAHEXANOIC ACID/EPA (FISH OIL ORAL), [DOCOSAHEXANOIC ACID/EPA (FISH OIL  "ORAL)] Take 1 capsule by mouth daily.  generic lancets (ONETOUCH ULTRASOFT), [GENERIC LANCETS (ONETOUCH ULTRASOFT)] TEST 2 TO 3 TIMES DAILY  hydroCHLOROthiazide (HYDRODIURIL) 25 MG tablet, [HYDROCHLOROTHIAZIDE (HYDRODIURIL) 25 MG TABLET] TAKE 1 TABLET (25 MG TOTAL) BY MOUTH ONCE DAILY.  insulin aspart U-100 (NOVOLOG FLEXPEN U-100 INSULIN) 100 unit/mL injection pen, [INSULIN ASPART U-100 (NOVOLOG FLEXPEN U-100 INSULIN) 100 UNIT/ML INJECTION PEN] Inject 6-10 Units under the skin 2 (two) times a day with meals.  LANTUS SOLOSTAR U-100 INSULIN 100 unit/mL (3 mL) pen, [LANTUS SOLOSTAR U-100 INSULIN 100 UNIT/ML (3 ML) PEN] INJECT 50 UNITS UNDER THE SKIN AT BEDTIME AS DIRECTED  lisinopril (PRINIVIL,ZESTRIL) 40 MG tablet, [LISINOPRIL (PRINIVIL,ZESTRIL) 40 MG TABLET] TAKE ONE TABLET BY MOUTH ONE TIME DAILY  metoprolol tartrate (LOPRESSOR) 25 MG tablet, [METOPROLOL TARTRATE (LOPRESSOR) 25 MG TABLET] TAKE ONE TABLET BY MOUTH TWICE DAILY   MULTIVITAMIN ORAL, [MULTIVITAMIN ORAL] Take 1 tablet by mouth daily.  mupirocin (BACTROBAN) 2 % ointment, [MUPIROCIN (BACTROBAN) 2 % OINTMENT] 2 (two) times a day. Apply sparingly to affected areas  ONETOUCH ULTRA BLUE TEST STRIP strips, [ONETOUCH ULTRA BLUE TEST STRIP STRIPS] TEST FOUR TIMES DAILY  simvastatin (ZOCOR) 80 MG tablet, [SIMVASTATIN (ZOCOR) 80 MG TABLET] Take 1 tablet (80 mg total) by mouth at bedtime.  ULTICARE PEN NEEDLE 32 gauge x 5/32\" Ndle, [ULTICARE PEN NEEDLE 32 GAUGE X 5/32\" NDLE] USE 2 NEEDLES PER DAY (EACH NEEDLE IS FOR SINGLE USE ONLY)        Review of Systems:  A Comprehensive greater than 10 system review of systems was carried out.  Pertinent positives and negatives are noted above.  Otherwise negative for contributory information.     Physical Exam:  Blood pressure (!) 142/59, pulse 55, temperature 97.5  F (36.4  C), temperature source Oral, resp. rate 20, height 1.549 m (5' 1\"), weight 96.6 kg (213 lb), SpO2 98 %.  Wt Readings from Last 1 Encounters:   07/19/23 " 96.6 kg (213 lb)       Exam:  General: Alert, awake, no acute distress.  HEENT: NC/AT, eyes anicteric, external occular movements intact, face symmetric.    Cardiac: RRR, S1, S2.  No murmurs appreciated.  Pulmonary: Normal chest rise, normal work of breathing.  Lungs CTA BL  Abdomen: Obese, soft, non-tender, non-distended.  Bowel Sounds Present.  No guarding.  Right-sided cholecystostomy tube in place.  Extremities: no deformities.  Warm, well perfused.  Skin: no rashes or lesions noted.  Warm and Dry.  Neuro: No focal deficits noted.  Speech clear.  Coordination and strength grossly normal.  Psych: Appropriate affect.    I have personally reviewed the following data including lab tests and imaging:  EKG: None.  Imaging:  Recent Results (from the past 48 hour(s))   Abd/pelvis CT no contrast - [facility] Protocol    Narrative    EXAM: CT ABDOMEN PELVIS W/O CONTRAST  LOCATION: [facility]  DATE: 7/19/2023    INDICATION: MYESHA, eval for hydronephrosis, recent cholecystostomy tube.  COMPARISON: 07/15/2023  TECHNIQUE: CT scan of the abdomen and pelvis was performed without IV contrast. Multiplanar reformats were obtained. Dose reduction techniques were used.  CONTRAST: None.    FINDINGS:   LOWER CHEST: Trace right pleural effusion now seen.    HEPATOBILIARY: New pigtail cholecystostomy tube with a decompressed urinary bladder layering gallstones. Some surrounding pericholecystic inflammation remaining with small amount of extraluminal air anterior to the gallbladder likely related to tube   placement.    PANCREAS: Normal.    SPLEEN: Normal.    ADRENAL GLANDS: Normal.    KIDNEYS/BLADDER: Normal.    BOWEL: There are multiple abdominal wall hernias again seen. There is new mild distention of the right hemicolon to the level of the splenic flexure. Air-fluid level in the involved colon. Findings may represent a developing segmental colitis and ileus.   Multiple hernias contain colon and small bowel as seen  previously.    LYMPH NODES: Normal.    VASCULATURE: Unremarkable.    PELVIC ORGANS: Hysterectomy.    MUSCULOSKELETAL: Multilevel lumbar degenerative change.      Impression    IMPRESSION:   1.  Layering calcified gallstones. Interval placement of a cholecystostomy tube with decreased gallbladder distention. There remains gallbladder wall thickening with mild surrounding inflammation. Small amount of air now seen anterior to the gallbladder   likely due to placement of the tube.    2.  There are multiple abdominal hernias seen. Previously containing multiple loops of colon and small bowel. There is new distention of the right hemicolon involving the cecum, ascending colon and extending to the level of the splenic flexure. Fluid   level in the involved segment of colon. Question segmental colitis of the segments with associated ileus. Follow-up is recommended.         Labs:  Recent Labs   Lab 07/19/23  1618   WBC 10.1   HGB 11.5*   HCT 34.0*   MCV 95             Lab Results   Component Value Date     07/19/2023     08/02/2018    Lab Results   Component Value Date    CHLORIDE 97 07/19/2023    CHLORIDE 111 08/02/2018    Lab Results   Component Value Date    BUN 78.5 07/19/2023    BUN 53 08/02/2018      Lab Results   Component Value Date    POTASSIUM 3.9 07/19/2023    POTASSIUM 4.7 08/02/2018    Lab Results   Component Value Date    CO2 20 07/19/2023    CO2 19 08/02/2018    Lab Results   Component Value Date    CR 3.71 07/19/2023    CR 1.70 08/02/2018        No results for input(s): AST, ALT, GGT, ALKPHOS, BILITOTAL, BILICONJ, BILIDIRECT, DIONISIO in the last 168 hours.    Invalid input(s): BILIRUBININDIRECT  No results for input(s): INR in the last 168 hours.    I've spent 50 minutes in chart review, ordering medications and tests, obtaining additional history from the EMR and the ER provider, evaluating the patient and in documentation for this encounter.    Manish Polanco MD  Hospitalist  [place]  [facility]

## 2023-07-19 NOTE — ED TRIAGE NOTES
Pt arrives to the ED due to having elevated creatinine of 4.4 Pt had recent hospitalization for gallbladder surgery. Denies any difficulty urinating. Pt states known kidney disease.

## 2023-07-20 LAB
ANION GAP SERPL CALCULATED.3IONS-SCNC: 13 MMOL/L (ref 7–15)
BUN SERPL-MCNC: 76.2 MG/DL (ref 8–23)
CALCIUM SERPL-MCNC: 9.5 MG/DL (ref 8.8–10.2)
CHLORIDE SERPL-SCNC: 104 MMOL/L (ref 98–107)
CREAT SERPL-MCNC: 3.42 MG/DL (ref 0.51–0.95)
DEPRECATED HCO3 PLAS-SCNC: 19 MMOL/L (ref 22–29)
GFR SERPL CREATININE-BSD FRML MDRD: 13 ML/MIN/1.73M2
GLUCOSE BLDC GLUCOMTR-MCNC: 137 MG/DL (ref 70–99)
GLUCOSE BLDC GLUCOMTR-MCNC: 148 MG/DL (ref 70–99)
GLUCOSE BLDC GLUCOMTR-MCNC: 152 MG/DL (ref 70–99)
GLUCOSE BLDC GLUCOMTR-MCNC: 177 MG/DL (ref 70–99)
GLUCOSE BLDC GLUCOMTR-MCNC: 197 MG/DL (ref 70–99)
GLUCOSE SERPL-MCNC: 165 MG/DL (ref 70–99)
POTASSIUM SERPL-SCNC: 3.7 MMOL/L (ref 3.4–5.3)
SODIUM SERPL-SCNC: 136 MMOL/L (ref 136–145)

## 2023-07-20 PROCEDURE — 250N000013 HC RX MED GY IP 250 OP 250 PS 637: Performed by: PHYSICIAN ASSISTANT

## 2023-07-20 PROCEDURE — 250N000013 HC RX MED GY IP 250 OP 250 PS 637: Performed by: INTERNAL MEDICINE

## 2023-07-20 PROCEDURE — G0378 HOSPITAL OBSERVATION PER HR: HCPCS

## 2023-07-20 PROCEDURE — 36415 COLL VENOUS BLD VENIPUNCTURE: CPT | Performed by: INTERNAL MEDICINE

## 2023-07-20 PROCEDURE — 96361 HYDRATE IV INFUSION ADD-ON: CPT

## 2023-07-20 PROCEDURE — 80048 BASIC METABOLIC PNL TOTAL CA: CPT | Performed by: INTERNAL MEDICINE

## 2023-07-20 PROCEDURE — 99232 SBSQ HOSP IP/OBS MODERATE 35: CPT | Performed by: PHYSICIAN ASSISTANT

## 2023-07-20 PROCEDURE — 82962 GLUCOSE BLOOD TEST: CPT

## 2023-07-20 RX ORDER — LACTOBACILLUS RHAMNOSUS GG 10B CELL
1 CAPSULE ORAL 2 TIMES DAILY
Status: DISCONTINUED | OUTPATIENT
Start: 2023-07-20 | End: 2023-07-21 | Stop reason: HOSPADM

## 2023-07-20 RX ADMIN — CIPROFLOXACIN HYDROCHLORIDE 500 MG: 500 TABLET, FILM COATED ORAL at 08:12

## 2023-07-20 RX ADMIN — ISOSORBIDE MONONITRATE 30 MG: 30 TABLET, EXTENDED RELEASE ORAL at 08:11

## 2023-07-20 RX ADMIN — HYDRALAZINE HYDROCHLORIDE 20 MG: 10 TABLET, FILM COATED ORAL at 08:11

## 2023-07-20 RX ADMIN — METRONIDAZOLE 500 MG: 500 TABLET ORAL at 20:07

## 2023-07-20 RX ADMIN — CARVEDILOL 12.5 MG: 12.5 TABLET, FILM COATED ORAL at 08:12

## 2023-07-20 RX ADMIN — METRONIDAZOLE 500 MG: 500 TABLET ORAL at 08:12

## 2023-07-20 RX ADMIN — HYDRALAZINE HYDROCHLORIDE 20 MG: 10 TABLET, FILM COATED ORAL at 20:07

## 2023-07-20 RX ADMIN — CARVEDILOL 12.5 MG: 12.5 TABLET, FILM COATED ORAL at 18:51

## 2023-07-20 RX ADMIN — Medication 1 CAPSULE: at 22:17

## 2023-07-20 RX ADMIN — ACETAMINOPHEN 650 MG: 325 TABLET, FILM COATED ORAL at 08:11

## 2023-07-20 ASSESSMENT — ACTIVITIES OF DAILY LIVING (ADL)
ADLS_ACUITY_SCORE: 22
ADLS_ACUITY_SCORE: 22
ADLS_ACUITY_SCORE: 20
ADLS_ACUITY_SCORE: 20
ADLS_ACUITY_SCORE: 22
ADLS_ACUITY_SCORE: 20
ADLS_ACUITY_SCORE: 22
ADLS_ACUITY_SCORE: 20

## 2023-07-20 NOTE — PLAN OF CARE
PRIMARY DIAGNOSIS: Acute Kidney Injury  OUTPATIENT/OBSERVATION GOALS TO BE MET BEFORE DISCHARGE:  1. ADLs back to baseline: Yes    2. Activity and level of assistance: A1X with walker    3. Pain status: Pain free.    4. Return to near baseline physical activity: Yes     Discharge Planner Nurse   Safe discharge environment identified: Yes  Barriers to discharge: No       Entered by: Medhat Mcnally RN 07/20/2023 4:57 PM     Please review provider order for any additional goals.   Nurse to notify provider when observation goals have been met and patient is ready for discharge.    A&OX4. VSS. Ax1 with gait belt and walker, ambulated to the restroom. Tolerating regular diet and oral medications. ACHS bloods sugar checks; 137 and 148. Insulin given. Tylenol given for muscle tension. Pt denies abdominal pain, nausea, vomiting, and diarrhea. Creatinine 3.71, will recheck with morning labs. Strict I&OS. Saline locked. Bed in lowest position and call light within reach.

## 2023-07-20 NOTE — PROGRESS NOTES
[facility]    Medicine Progress Note - Hospitalist Service    Date of Admission:  7/19/2023    Assessment & Plan   Gi Bailey is a 83 year old female with PMH including stage IV CKD, obesity, insulin-dependent type 2 diabetes, hypertension and recent admission at [facility] for cholecystitis during which time she underwent percutaneous cholecystostomy tube and was discharged 3 days ago on 7/16 who presents with abnormal labs.  The day of discharge her creatinine was up very slightly from her baseline at 3.26.  Apparently baseline is closer to 2.8-3.  The plan was for short-term follow-up to recheck this.  Also, her diuretic was held upon discharge pending recheck. On 7/19 she had outpatient labs done with creatinine returning at 4.4.  She was referred to the ER.      Here in the ER, evaluation was revealing of stable hemodynamics.  Lab work-up here showed sodium of 133, BUN of 78.5, creatinine of 3.71 and anion gap of 16.  Glucose was elevated at 232.  She was given 1 L of IV fluids and will be admitted for further care for acute kidney injury.    #Acute on chronic kidney disease: at and already trending down to 3.7 on admission compared to 4.4 earlier on 7/19.  Baseline apparently is 2.8-3.  Suspect this is related to recent cholecystitis and diarrhea as noted below.   -received 1 more liter of normal saline over 10 hours on admission   -creatinine improving to 3.42 but with ongoing diarrhea  -monitor I&Os  -avoid nephrotoxins  -continue to hold Torsemide  -encourage oral intake, if increased stool output then give additional IVFs     #Diarrhea: reports onset the day of discharge on 7/18 from [facility] of dark colored diarrhea almost every time she urinates. She has felt bloated, passing a lot of flatus, and no associated nausea or vomiting. CT of abdomen/pelvis on 7/19 showed new distention of right hemicolon involving cecum, ascending colon, and extending to level of splenic flexure,  question segmental colitis of the segments with associated ileus.   -no current concern for bowel obstruction, encourage mobility with possible ileus on CT  -monitor I&Os  -start probiotic since antibiotics could be contributing  -encourage oral intake to keep up with output   -would hold off on checking enteric and cdiff due to no fever or significant abdominal pain     #Recent episode of cholecystitis treated with cholecystostomy tube: discharged from [facility] on 7/16.  -continue PTA Ciprofloxacin and Flagyl at renal doses   -per patient she was not instructed to change bag on cholecystostomy tube  -continue outpatient f/u with general surgery in 2 weeks based on previous discharge note      #Insulin-dependent type 2 diabetes: normally takes 65 units of U-500 in the evening in addition to 8 units with breakfast, 2 units with lunch and 6 units with dinner  -BG stable  -continue mealtime insulin at her usual dose  -Lantus decreased to 40 units on admission, continue for now   -high sliding scale insulin       #H/o hypertension  #?Chronic diastolic CHF:   -most recent echo on 8/9/2021 showed EF of 63%  -continue to hold PTA Torsemide   -continue PTA home Coreg, Imdur and hydralazine.     #Obesity: Complicates care.     #Mild hypercalcemia: noted on admission, improved on recheck.      Diet: Regular Diet Adult    DVT Prophylaxis: Ambulate every shift  Keen Catheter: Not present  Lines: None     Cardiac Monitoring: None  Code Status: Full Code      Clinically Significant Risk Factors Present on Admission           # Hypercalcemia: Highest Ca = 10.2 mg/dL in last 2 days, will monitor as appropriate      # Drug Induced Platelet Defect: home medication list includes an antiplatelet medication   # Hypertension: Noted on problem list     # DMII: A1C = 7.3 % (Ref range: <5.7 %) within past 6 months   # Severe Obesity: Estimated body mass index is 42.32 kg/m  as calculated from the following:    Height as of this  "encounter: 1.549 m (5' 1\").    Weight as of this encounter: 101.6 kg (224 lb).            Disposition Plan      Expected Discharge Date: 07/21/2023                The patient's care was discussed with the Attending Physician, Dr. Hernandez, Bedside Nurse and Patient.    Macarena Le PA-C  Hospitalist Service  [facility]  Securely message with [employer] (more info)  Text page via [facility] Paging/Directory   ______________________________________________________________________    Interval History   Patient reports diarrhea since the prior to discharge on 7/18 with associated bloating and increased flatus. She notes discomfort around her drain incision site otherwise no other abdominal pain. Denies nausea, vomiting, chest pain, or shortness of breath.     Physical Exam   Vital Signs: Temp: 98.2  F (36.8  C) Temp src: Oral BP: (!) 165/127 Pulse: 54   Resp: 18 SpO2: 97 % O2 Device: None (Room air)    Weight: 224 lbs 0 oz    Constitutional: awake, alert, cooperative, no apparent distress, and appears stated age  ENT: normocepalic, without obvious abnormality, atramatic, oral pharynx with moist mucus membranes  Respiratory: good air exchange, clear to auscultation bilaterally, no crackles or wheezing  Cardiovascular: regular rate and rhythm, normal S1 and S2, no S3 or S4, and no murmur noted  GI: normal bowel sounds, soft, non-distended, non-tender, cholecystostomy tube in place with bilious drainage   Skin: no bruising or bleeding and normal skin color, texture, turgor  Musculoskeletal: no redness, warmth, or swelling of the joints.  Full range of motion noted.  Motor strength is 5 out of 5 all extremities bilaterally.  Tone is normal.  Neurologic: Awake, alert, oriented to name, place and time. Motor is 5 out of 5 bilaterally. Sensory is intact.    Medical Decision Making       45 MINUTES SPENT BY ME on the date of service doing chart review, history, exam, documentation & further activities per the " note.      Data   ------------------------- PAST 24 HR DATA REVIEWED -----------------------------------------------

## 2023-07-20 NOTE — PLAN OF CARE
PRIMARY DIAGNOSIS: Acute Kidney Injury  OUTPATIENT/OBSERVATION GOALS TO BE MET BEFORE DISCHARGE:  1. ADLs back to baseline: Yes    2. Activity and level of assistance: A1X with walker    3. Pain status: Pain free.    4. Return to near baseline physical activity: Yes     Discharge Planner Nurse   Safe discharge environment identified: Yes  Barriers to discharge: No       Entered by: Rohini Ordonez RN 07/20/2023 5:28 AM     Please review provider order for any additional goals.   Nurse to notify provider when observation goals have been met and patient is ready for discharge.    Asleep in between cares. No complaints overnight. Plan BMP check this AM if improving likely for discharge.

## 2023-07-20 NOTE — PHARMACY-ADMISSION MEDICATION HISTORY
Pharmacist Admission Medication History    Admission medication history is complete. The information provided in this note is only as accurate as the sources available at the time of the update.    Medication reconciliation/reorder completed by provider prior to medication history? Yes    Information Source(s): Patient via in-person    Pertinent Information: medlist with patient    Changes made to PTA medication list:    Added: torsemide, coreg, hydralazine, allopurinol, Imdur, adalact CC 90mg    Deleted: vitamin D3, hydrochlorothiazide,zestril, loppresor, mvi, bactroban oint    Changed: fosamax, asa, novolog, lantus, zocor    Medication Affordability:  Not including over the counter (OTC) medications, was there a time in the past 3 months when you did not take your medications as prescribed because of cost?: No    Allergies reviewed with patient and updates made in EHR: yes   For patients on insulin therapy:  Do you use sliding scale insulin based on blood sugars? No  Do you typically eat three meals a day? Yes  How many times do you check your blood glucose per day? 2  How many episodes of hypoglycemia do you typically have per month? 2      Medication History Completed By: Faisal Alaniz RPH 7/19/2023 7:45 PM    Prior to Admission medications    Medication Sig Last Dose Taking? Auth Provider Long Term End Date   alendronate (FOSAMAX) 70 MG tablet Take 70 mg by mouth every 14 days 7/18/2023 Yes Unknown, Entered By History     allopurinol (ZYLOPRIM) 100 MG tablet Take 100 mg by mouth daily 7/19/2023 at am Yes Unknown, Entered By History     aspirin 325 MG tablet Take 81 mg by mouth At Bedtime 7/18/2023 Yes Gertrudis Guadarrama, NP Yes    carvedilol (COREG) 12.5 MG tablet Take 12.5 mg by mouth 2 times daily (with meals) 7/19/2023 at am Yes Unknown, Entered By History     cyanocobalamin, vitamin B-12, (VITAMIN B-12 ORAL) [CYANOCOBALAMIN, VITAMIN B-12, (VITAMIN B-12 ORAL)] Take by mouth daily. 7/18/2023 Yes Provider, Historical      DOCOSAHEXANOIC ACID/EPA (FISH OIL ORAL) [DOCOSAHEXANOIC ACID/EPA (FISH OIL ORAL)] Take 1 capsule by mouth daily. 7/18/2023 Yes Provider, Historical Yes    hydrALAZINE (APRESOLINE) 10 MG tablet Take 40 mg by mouth 2 times daily 7/19/2023 at am Yes Unknown, Entered By History     insulin aspart U-100 (NOVOLOG FLEXPEN U-100 INSULIN) 100 unit/mL injection pen [INSULIN ASPART U-100 (NOVOLOG FLEXPEN U-100 INSULIN) 100 UNIT/ML INJECTION PEN] Inject 6-10 Units under the skin 2 (two) times a day with meals.  Patient taking differently: Inject 2-8 Units Subcutaneous 3 times daily (with meals) 8 unit AM, 2 units lunch, 6pm supper 7/19/2023 at am Yes Gertrudis Guadarrama Yes    insulin glargine (LANTUS VIAL) 100 UNIT/ML vial Inject 63 Units Subcutaneous At Bedtime 7/18/2023 Yes Unknown, Entered By History No    isosorbide mononitrate (IMDUR) 30 MG 24 hr tablet Take 30 mg by mouth daily 7/19/2023 at am Yes Unknown, Entered By History     NIFEdipine ER (ADALAT CC) 90 MG 24 hr tablet Take 90 mg by mouth daily 7/19/2023 at am Yes Unknown, Entered By History     simvastatin (ZOCOR) 40 MG tablet Take 40 mg by mouth At Bedtime 7/18/2023 Yes Unknown, Entered By History No    torsemide (DEMADEX) 20 MG tablet Take 60 mg by mouth daily Past Week Yes Unknown, Entered By History Yes

## 2023-07-20 NOTE — PLAN OF CARE
PRIMARY DIAGNOSIS: Acute Kidney Injury  OUTPATIENT/OBSERVATION GOALS TO BE MET BEFORE DISCHARGE:  1. ADLs back to baseline: Yes    2. Activity and level of assistance: A1X with walker    3. Pain status: Pain free.    4. Return to near baseline physical activity: Yes     Discharge Planner Nurse   Safe discharge environment identified: Yes  Barriers to discharge: No       Entered by: Rohini Ordonez RN 07/20/2023 1:33 AM     Please review provider order for any additional goals.   Nurse to notify provider when observation goals have been met and patient is ready for discharge.    Pt admitted from ED. Cholecystostomy tube in place (rt lower abdomen), dressing CDI. And drainage bag emptied obtained 15ml dark green drainage. Started on IV fluids.Blood sugar checked, Lantus given no sliding scale insulin coverage needed. Plan if kidney functions improve discharge in AM.

## 2023-07-20 NOTE — PLAN OF CARE
PRIMARY DIAGNOSIS: Acute Kidney Injury  OUTPATIENT/OBSERVATION GOALS TO BE MET BEFORE DISCHARGE:  1. ADLs back to baseline: Yes    2. Activity and level of assistance: A1X with walker    3. Pain status: Pain free.    4. Return to near baseline physical activity: Yes     Discharge Planner Nurse   Safe discharge environment identified: Yes  Barriers to discharge: No       Entered by: Medhat Mcnally RN 07/20/2023 4:58 PM     Please review provider order for any additional goals.   Nurse to notify provider when observation goals have been met and patient is ready for discharge.    A&OX4. VSS. Ax1 with gait belt and walker, ambulated to the restroom. Tolerating regular diet and oral medications. ACHS bloods sugar check 137, no insulin needed. Tylenol given for muscle tension. Pt denies abdominal pain, nausea, vomiting, and diarrhea. Creatinine 3.71, will recheck with morning labs. Saline locked. Bed in lowest position and call light within reach.

## 2023-07-20 NOTE — PLAN OF CARE
"PRIMARY DIAGNOSIS: Acute Kidney Injury  OUTPATIENT/OBSERVATION GOALS TO BE MET BEFORE DISCHARGE:  1. ADLs back to baseline: Yes    2. Activity and level of assistance: A1X with walker    3. Pain status: Pain free.    4. Return to near baseline physical activity: Yes     Discharge Planner Nurse   Safe discharge environment identified: Yes  Barriers to discharge: No       Entered by: Medhat Mcnally RN 07/20/2023 4:52 PM     Please review provider order for any additional goals.   Nurse to notify provider when observation goals have been met and patient is ready for discharge.    A&OX4. VSS. Ax1 with gait belt and walker, ambulated to the restroom. Tolerating regular diet and oral medications. ACHS bloods sugar checks; 137, 148, 197. Insulin given. Tylenol given for muscle tension. Pt denies abdominal pain, nausea, vomiting, and diarrhea. Creatinine 3.71, will recheck with morning labs. Strict I&OS. Saline locked. Bed in lowest position and call light within reach.    Blood pressure (!) 181/68, pulse 55, temperature 97.5  F (36.4  C), temperature source Oral, resp. rate 18, height 1.549 m (5' 1\"), weight 101.6 kg (224 lb), SpO2 96 %.      "

## 2023-07-20 NOTE — PLAN OF CARE
ROOM # 206-2    Living Situation (if not independent, order SW consult): Lives with family (grandson & wife  Facility name: NA  : Jd (son)    Activity level at baseline: Ind with cane/walker outside  Activity level on admit: A1x W/GB    Who will be transporting you at discharge: Family    Patient registered to observation; given Patient Bill of Rights; given the opportunity to ask questions about observation status and their plan of care.  Patient has been oriented to the observation room, bathroom and call light is in place.    Discussed discharge goals and expectations with patient/family.

## 2023-07-21 VITALS
TEMPERATURE: 98.1 F | HEIGHT: 61 IN | OXYGEN SATURATION: 98 % | WEIGHT: 224.2 LBS | RESPIRATION RATE: 18 BRPM | SYSTOLIC BLOOD PRESSURE: 203 MMHG | BODY MASS INDEX: 42.33 KG/M2 | DIASTOLIC BLOOD PRESSURE: 83 MMHG | HEART RATE: 57 BPM

## 2023-07-21 LAB
ANION GAP SERPL CALCULATED.3IONS-SCNC: 11 MMOL/L (ref 7–15)
BUN SERPL-MCNC: 67.1 MG/DL (ref 8–23)
CALCIUM SERPL-MCNC: 9.4 MG/DL (ref 8.8–10.2)
CHLORIDE SERPL-SCNC: 110 MMOL/L (ref 98–107)
CREAT SERPL-MCNC: 2.85 MG/DL (ref 0.51–0.95)
DEPRECATED HCO3 PLAS-SCNC: 18 MMOL/L (ref 22–29)
GFR SERPL CREATININE-BSD FRML MDRD: 16 ML/MIN/1.73M2
GLUCOSE BLDC GLUCOMTR-MCNC: 125 MG/DL (ref 70–99)
GLUCOSE BLDC GLUCOMTR-MCNC: 136 MG/DL (ref 70–99)
GLUCOSE BLDC GLUCOMTR-MCNC: 61 MG/DL (ref 70–99)
GLUCOSE BLDC GLUCOMTR-MCNC: 93 MG/DL (ref 70–99)
GLUCOSE BLDC GLUCOMTR-MCNC: 99 MG/DL (ref 70–99)
GLUCOSE SERPL-MCNC: 75 MG/DL (ref 70–99)
POTASSIUM SERPL-SCNC: 3.7 MMOL/L (ref 3.4–5.3)
SODIUM SERPL-SCNC: 139 MMOL/L (ref 136–145)

## 2023-07-21 PROCEDURE — 80048 BASIC METABOLIC PNL TOTAL CA: CPT | Performed by: PHYSICIAN ASSISTANT

## 2023-07-21 PROCEDURE — 99239 HOSP IP/OBS DSCHRG MGMT >30: CPT | Performed by: PHYSICIAN ASSISTANT

## 2023-07-21 PROCEDURE — 82962 GLUCOSE BLOOD TEST: CPT

## 2023-07-21 PROCEDURE — 250N000013 HC RX MED GY IP 250 OP 250 PS 637: Performed by: PHYSICIAN ASSISTANT

## 2023-07-21 PROCEDURE — 36415 COLL VENOUS BLD VENIPUNCTURE: CPT | Performed by: PHYSICIAN ASSISTANT

## 2023-07-21 PROCEDURE — 250N000013 HC RX MED GY IP 250 OP 250 PS 637: Performed by: INTERNAL MEDICINE

## 2023-07-21 PROCEDURE — G0378 HOSPITAL OBSERVATION PER HR: HCPCS

## 2023-07-21 RX ORDER — ALLOPURINOL 100 MG/1
100 TABLET ORAL DAILY
Status: DISCONTINUED | OUTPATIENT
Start: 2023-07-21 | End: 2023-07-21 | Stop reason: HOSPADM

## 2023-07-21 RX ORDER — ASPIRIN 81 MG/1
81 TABLET ORAL AT BEDTIME
Status: DISCONTINUED | OUTPATIENT
Start: 2023-07-21 | End: 2023-07-21 | Stop reason: HOSPADM

## 2023-07-21 RX ORDER — HYDRALAZINE HYDROCHLORIDE 10 MG/1
40 TABLET, FILM COATED ORAL 2 TIMES DAILY
Status: DISCONTINUED | OUTPATIENT
Start: 2023-07-21 | End: 2023-07-21 | Stop reason: HOSPADM

## 2023-07-21 RX ORDER — METRONIDAZOLE 500 MG/1
500 TABLET ORAL 2 TIMES DAILY
COMMUNITY
Start: 2023-07-21

## 2023-07-21 RX ORDER — INSULIN GLARGINE 100 [IU]/ML
30 INJECTION, SOLUTION SUBCUTANEOUS AT BEDTIME
COMMUNITY
Start: 2023-07-21

## 2023-07-21 RX ORDER — NIFEDIPINE 30 MG/1
90 TABLET, EXTENDED RELEASE ORAL DAILY
Status: DISCONTINUED | OUTPATIENT
Start: 2023-07-21 | End: 2023-07-21 | Stop reason: HOSPADM

## 2023-07-21 RX ORDER — HYDRALAZINE HYDROCHLORIDE 10 MG/1
20 TABLET, FILM COATED ORAL ONCE
Status: COMPLETED | OUTPATIENT
Start: 2023-07-21 | End: 2023-07-21

## 2023-07-21 RX ORDER — CIPROFLOXACIN 500 MG/1
500 TABLET, FILM COATED ORAL EVERY 24 HOURS
COMMUNITY
Start: 2023-07-22

## 2023-07-21 RX ORDER — SIMVASTATIN 40 MG
40 TABLET ORAL AT BEDTIME
Status: DISCONTINUED | OUTPATIENT
Start: 2023-07-21 | End: 2023-07-21 | Stop reason: HOSPADM

## 2023-07-21 RX ORDER — SACCHAROMYCES BOULARDII 250 MG
250 CAPSULE ORAL 2 TIMES DAILY
Qty: 20 CAPSULE | Refills: 0 | Status: SHIPPED | OUTPATIENT
Start: 2023-07-21 | End: 2023-07-31

## 2023-07-21 RX ADMIN — CARVEDILOL 12.5 MG: 12.5 TABLET, FILM COATED ORAL at 08:39

## 2023-07-21 RX ADMIN — Medication 1 CAPSULE: at 08:39

## 2023-07-21 RX ADMIN — HYDRALAZINE HYDROCHLORIDE 20 MG: 10 TABLET, FILM COATED ORAL at 11:50

## 2023-07-21 RX ADMIN — ALLOPURINOL 100 MG: 100 TABLET ORAL at 11:50

## 2023-07-21 RX ADMIN — ISOSORBIDE MONONITRATE 30 MG: 30 TABLET, EXTENDED RELEASE ORAL at 08:39

## 2023-07-21 RX ADMIN — NIFEDIPINE 90 MG: 30 TABLET, FILM COATED, EXTENDED RELEASE ORAL at 11:50

## 2023-07-21 RX ADMIN — HYDRALAZINE HYDROCHLORIDE 20 MG: 10 TABLET, FILM COATED ORAL at 08:39

## 2023-07-21 RX ADMIN — CIPROFLOXACIN HYDROCHLORIDE 500 MG: 500 TABLET, FILM COATED ORAL at 08:39

## 2023-07-21 RX ADMIN — METRONIDAZOLE 500 MG: 500 TABLET ORAL at 08:39

## 2023-07-21 ASSESSMENT — ACTIVITIES OF DAILY LIVING (ADL)
ADLS_ACUITY_SCORE: 20

## 2023-07-21 NOTE — PROGRESS NOTES
"PRIMARY DIAGNOSIS: ACUTE KIDNEY INJURY   OUTPATIENT/OBSERVATION GOALS TO BE MET BEFORE DISCHARGE:  1. ADLs back to baseline: Yes    2. Activity and level of assistance: Up with standby assistance.    3. Pain status: Pain free.    4. Return to near baseline physical activity: Yes     Discharge Planner Nurse   Safe discharge environment identified: Yes  Barriers to discharge: No       Entered by: Sonia Cha RN 07/21/2023   BP (!) 143/69 (BP Location: Right arm)   Pulse 58   Temp 98  F (36.7  C) (Oral)   Resp 18   Ht 1.549 m (5' 1\")   Wt 102.2 kg (225 lb 6.4 oz)   SpO2 98%   BMI 42.59 kg/m    Please review provider order for any additional goals.   Nurse to notify provider when observation goals have been met and patient is ready for discharge.  "

## 2023-07-21 NOTE — PROGRESS NOTES
"PRIMARY DIAGNOSIS: ACUTE KIDNEY INJURY   OUTPATIENT/OBSERVATION GOALS TO BE MET BEFORE DISCHARGE:  1. ADLs back to baseline: Yes    2. Activity and level of assistance: Up with standby assistance.    3. Pain status: Pain free.    4. Return to near baseline physical activity: Yes     Discharge Planner Nurse   Safe discharge environment identified: Yes  Barriers to discharge: No       Entered by: Sonia Cha RN 07/21/2023   BP (!) 179/76 (BP Location: Right arm)   Pulse 62   Temp 97.6  F (36.4  C) (Oral)   Resp 19   Ht 1.549 m (5' 1\")   Wt 102.2 kg (225 lb 6.4 oz)   SpO2 100%   BMI 42.59 kg/m      Alert & oriented x 4. Denies pain. VSS. Assist x 1 with GB & Walker. On strict I & Os. BGs ACHS No complains overnight. Repeat labs in AM then possible  discharge home if labs ok.     0620: call received  from lab. Morning labs will be redrawn due to possible false reading.    Please review provider order for any additional goals.   Nurse to notify provider when observation goals have been met and patient is ready for discharge.  "

## 2023-07-21 NOTE — PROGRESS NOTES
PRIMARY DIAGNOSIS: Acute Kidney Injury   OUTPATIENT/OBSERVATION GOALS TO BE MET BEFORE DISCHARGE:  1. ADLs back to baseline: Yes    2. Activity and level of assistance: Up with standby assistance.    3. Pain status: Pain free.    4. Return to near baseline physical activity: Yes     Discharge Planner Nurse   Safe discharge environment identified: Yes  Barriers to discharge: No       Entered by: Misty Adrian RN 07/20/2023      Pt is A&Ox4. VSS on RA. Regular diet. Strict I&O's. No diarrhea this shift. Recheck labs in AM.   Please review provider order for any additional goals.   Nurse to notify provider when observation goals have been met and patient is ready for discharge.

## 2023-07-21 NOTE — PLAN OF CARE
Patient's After Visit Summary was reviewed with patient and/or family.   Patient verbalized understanding of After Visit Summary, recommended follow up and was given an opportunity to ask questions.   Discharge medications sent home with patient/family: No   Discharged with son    Goal Outcome Evaluation:

## 2023-07-21 NOTE — DISCHARGE SUMMARY
[facility]    Discharge Summary  Hospitalist    Date of Admission:  7/19/2023  Date of Discharge:  7/21/2023  Provider:  Mita Le PA-C  Date of Service (when I last saw the patient): 07/21/23    Discharge Diagnoses   MYESHA on CKD  Diarrhea  Recent episode of cholecystitis treated with cholecystostomy tube   HTN  Hypoglycemia in setting of IDDM2    Other medical issues:  Refer to below     History of Present Illness   Gi Bailey is a 83 year old female with PMH including stage IV CKD, obesity, insulin-dependent type 2 diabetes, hypertension and recent admission at [facility] for cholecystitis during which time she underwent percutaneous cholecystostomy tube and was discharged 3 days ago on 7/16 who presents with abnormal labs.  The day of discharge her creatinine was up very slightly from her baseline at 3.26.  Apparently baseline is closer to 2.8-3.  The plan was for short-term follow-up to recheck this.  Also, her diuretic was held upon discharge pending recheck. On 7/19 she had outpatient labs done with creatinine returning at 4.4.  She was referred to the ER.      Here in the ER, evaluation was revealing of stable hemodynamics.  Lab work-up here showed sodium of 133, BUN of 78.5, creatinine of 3.71 and anion gap of 16.  Glucose was elevated at 232.  She was given 1 L of IV fluids and will be admitted for further care for acute kidney injury. Please see the admission history and physical for full details.    Hospital Course   Gi Bailey was admitted on 7/19/2023.  The following problems were addressed during her hospitalization:    #Acute on chronic kidney disease: at and already trending down to 3.7 on admission compared to 4.4 earlier on 7/19.  Baseline apparently is 2.8-3.  Suspect this is related to recent cholecystitis and diarrhea as noted below.   -received 1 more liter of normal saline over 10 hours on admission   -creatinine improving to 3.42 but with ongoing  diarrhea  -Torsemide held on admission  -encouraged oral intake and monitored off IVFs for an additional day due to diarrhea  -creatinine improved to near baseline at 2.85 prior to discharge      #Diarrhea: reports onset the day of discharge on 7/18 from [facility] of dark colored diarrhea almost every time she urinates. She has felt bloated, passing a lot of flatus, and no associated nausea or vomiting. CT of abdomen/pelvis on 7/19 showed new distention of right hemicolon involving cecum, ascending colon, and extending to level of splenic flexure, question segmental colitis of the segments with associated ileus.   -no current concern for bowel obstruction, mobility encouraged during stay with possible ileus on CT  -start probiotic since antibiotics could be contributing, continue at discharge while on antibiotics   -diarrhea slowing during stay and patient tolerating adequate oral intake   -did not check enteric and cdiff due to no fever or significant abdominal pain      #Recent episode of cholecystitis treated with cholecystostomy tube: discharged from [facility] on 7/16.  -continue PTA Ciprofloxacin and Flagyl at renal doses   -per patient she was not instructed to change bag on cholecystostomy tube  -continue outpatient f/u with general surgery in 2 weeks based on previous discharge note      #Episode of hypoglycemia  #Insulin-dependent type 2 diabetes: normally takes 65 units of Lantus (mistype on admission note to be U-500) in the evening in addition to 8 units with breakfast, 2 units with lunch and 6 units with dinner  -episode of hypoglycemia down to 61 during stay with reducing Lantus  -continue mealtime insulin at her usual dose  -Lantus decreased even further to 30 units in the evening   -needs close outpatient follow up for monitoring, her blood glucose level will likely continue to increase as her appetite improves with resolution of above infection      #H/o hypertension  #?Chronic diastolic CHF:  "  -most recent echo on 8/9/2021 showed EF of 63%  -PTA Torsemide held on admission, able to resume at discharge with improvement in kidney function   -continue PTA home Coreg, Imdur and hydralazine  -BP noted to be quite elevated prior to discharge with minimal improvement on recheck, unclear if related to being told her BP needed to improve in order to discharge, likely reintroducing Torsemide will help   -recommend keeping daily log of BP to bring to follow up and review if it has been consistently elevated      #Obesity: Complicates care.     #Mild hypercalcemia: noted on admission, improved on recheck.     Pending Results   None    Code Status   Full Code       Primary Care Physician   [facility]    Exam:  BP (!) 203/83 (BP Location: Left arm)   Pulse 57   Temp 98.1  F (36.7  C) (Oral)   Resp 18   Ht 1.549 m (5' 1\")   Wt 101.7 kg (224 lb 3.2 oz)   SpO2 98%   BMI 42.36 kg/m    Constitutional: awake, alert, cooperative, NAD  ENT: NC/AT, oral pharynx with moist mucus membranes  Respiratory: CTAB with no crackles or wheezing  Cardiovascular: regular rate and rhythm, normal S1 and S2, no S3 or S4, and no murmur noted  GI: normal bowel sounds, soft, non-distended, non-tender, cholecystostomy tube in place with bilious drainage   Skin: no bruising or bleeding and normal skin color, texture  Musculoskeletal: no redness, warmth, or swelling of the joints.  Full range of motion noted.  Motor strength is 5 out of 5 all extremities bilaterally.  Tone is normal.  Neurologic: Awake, alert, oriented to name, place and time. Motor is 5 out of 5 bilaterally. Sensory is intact.    Discharge Disposition   Discharged to home    Consultations This Hospital Stay   None    Time Spent on this Encounter   I, Macarena Le PA-C, personally saw the patient today and spent greater than 30 minutes discharging this patient.    Discharge Orders      Reason for your hospital stay    You were " admitted due to concerns for worsening kidney function after recent admission for cholecystitis at [facility].  Initial labs revealed creatinine of 3.7 with slow improvement down to 2.84 the day of discharge, which is near your baseline. Your kidney function improved after IV fluids and holding your diuretic.  Your kidney function was likely elevated due to ongoing diarrhea since discharge and acute infection.  It is recommended you continue increased fluid intake to maintain hydration.  Due to improvement in your kidney function you were able to restart your torsemide upon discharge.  You were started on a probiotic daily to help with the diarrhea likely from antibiotics.  There were no adjustments made to your cholecystostomy tube and you should follow-up as previously recommended for monitoring of this and management per surgery.  You should continue your previously prescribed antibiotics for total of 7 days, you received 2 days while admitted.  Due to decreased oral intake your blood sugars have been lower than normal and your Lantus dose was decreased from 63 units every evening to 30 units upon discharge due to a low blood sugar when you received 40 units the evening prior.  Based on your blood sugar trend you should continue with your mealtime insulin.  You will need to closely follow with your primary care provider to monitor your blood sugar levels and adjust her insulin based on this.     Follow-up and recommended labs and tests     Follow up with primary care provider, [facility], within 5-7 days for hospital follow- up, blood glucose check, and monitor creatinine.  The following labs/tests are recommended: BMP.     Activity    Your activity upon discharge: activity as tolerated     Monitor and record    blood glucose 4 times a day, before meals and at bedtime and keep log to bring to primary care due to adjusting your insulin this hospital stay     Discharge  Instructions    1. Due to slightly lower blood glucose levels while admitted your Lantus was decreased to 30 units, continue your current mealtime insulin dosing  2. Due to improvement in your kidney function you are able to restart your Torsemide  3. While taking antibiotics you should be on a probiotic   4. Complete previously prescribed Ciprofloxacin and Flagyl for treatment of your cholecystitis     Diet    Follow this diet upon discharge: Orders Placed This Encounter      Regular Diet Adult     Discharge Medications   Current Discharge Medication List        START taking these medications    Details   ciprofloxacin (CIPRO) 500 MG tablet Take 1 tablet (500 mg) by mouth every 24 hours      metroNIDAZOLE (FLAGYL) 500 MG tablet Take 1 tablet (500 mg) by mouth 2 times daily      saccharomyces boulardii (FLORASTOR) 250 MG capsule Take 1 capsule (250 mg) by mouth 2 times daily for 10 days  Qty: 20 capsule, Refills: 0    Associated Diagnoses: Diarrhea, unspecified type           CONTINUE these medications which have CHANGED    Details   insulin aspart (NOVOLOG PEN) 100 UNIT/ML pen Inject 2-8 Units Subcutaneous 3 times daily (with meals) 8 unit AM, 2 units lunch, 6pm supper    Associated Diagnoses: Type 2 diabetes mellitus without complication, with long-term current use of insulin (H)      insulin glargine (LANTUS VIAL) 100 UNIT/ML vial Inject 30 Units Subcutaneous At Bedtime           CONTINUE these medications which have NOT CHANGED    Details   alendronate (FOSAMAX) 70 MG tablet Take 70 mg by mouth every 14 days      allopurinol (ZYLOPRIM) 100 MG tablet Take 100 mg by mouth daily      aspirin 81 MG EC tablet Take 81 mg by mouth At Bedtime      carvedilol (COREG) 12.5 MG tablet Take 12.5 mg by mouth 2 times daily (with meals)      cyanocobalamin, vitamin B-12, (VITAMIN B-12 ORAL) [CYANOCOBALAMIN, VITAMIN B-12, (VITAMIN B-12 ORAL)] Take by mouth daily.      DOCOSAHEXANOIC ACID/EPA (FISH OIL ORAL) [DOCOSAHEXANOIC  ACID/EPA (FISH OIL ORAL)] Take 1 capsule by mouth daily.      hydrALAZINE (APRESOLINE) 10 MG tablet Take 40 mg by mouth 2 times daily      isosorbide mononitrate (IMDUR) 30 MG 24 hr tablet Take 30 mg by mouth daily      NIFEdipine ER (ADALAT CC) 90 MG 24 hr tablet Take 90 mg by mouth daily      simvastatin (ZOCOR) 40 MG tablet Take 40 mg by mouth At Bedtime      torsemide (DEMADEX) 20 MG tablet Take 60 mg by mouth daily           Allergies   Allergies   Allergen Reactions    Penicillins Rash    Pioglitazone Swelling     Data   Results for orders placed or performed during the hospital encounter of 07/19/23   Abd/pelvis CT no contrast - [facility] Protocol     Status: None    Narrative    EXAM: CT ABDOMEN PELVIS W/O CONTRAST  LOCATION: [facility]  DATE: 7/19/2023    INDICATION: MYESHA, eval for hydronephrosis, recent cholecystostomy tube.  COMPARISON: 07/15/2023  TECHNIQUE: CT scan of the abdomen and pelvis was performed without IV contrast. Multiplanar reformats were obtained. Dose reduction techniques were used.  CONTRAST: None.    FINDINGS:   LOWER CHEST: Trace right pleural effusion now seen.    HEPATOBILIARY: New pigtail cholecystostomy tube with a decompressed urinary bladder layering gallstones. Some surrounding pericholecystic inflammation remaining with small amount of extraluminal air anterior to the gallbladder likely related to tube   placement.    PANCREAS: Normal.    SPLEEN: Normal.    ADRENAL GLANDS: Normal.    KIDNEYS/BLADDER: Normal.    BOWEL: There are multiple abdominal wall hernias again seen. There is new mild distention of the right hemicolon to the level of the splenic flexure. Air-fluid level in the involved colon. Findings may represent a developing segmental colitis and ileus.   Multiple hernias contain colon and small bowel as seen previously.    LYMPH NODES: Normal.    VASCULATURE: Unremarkable.    PELVIC ORGANS: Hysterectomy.    MUSCULOSKELETAL: Multilevel lumbar  degenerative change.      Impression    IMPRESSION:   1.  Layering calcified gallstones. Interval placement of a cholecystostomy tube with decreased gallbladder distention. There remains gallbladder wall thickening with mild surrounding inflammation. Small amount of air now seen anterior to the gallbladder   likely due to placement of the tube.    2.  There are multiple abdominal hernias seen. Previously containing multiple loops of colon and small bowel. There is new distention of the right hemicolon involving the cecum, ascending colon and extending to the level of the splenic flexure. Fluid   level in the involved segment of colon. Question segmental colitis of the segments with associated ileus. Follow-up is recommended.     Basic metabolic panel     Status: Abnormal   Result Value Ref Range    Sodium 133 (L) 136 - 145 mmol/L    Potassium 3.9 3.4 - 5.3 mmol/L    Chloride 97 (L) 98 - 107 mmol/L    Carbon Dioxide (CO2) 20 (L) 22 - 29 mmol/L    Anion Gap 16 (H) 7 - 15 mmol/L    Urea Nitrogen 78.5 (H) 8.0 - 23.0 mg/dL    Creatinine 3.71 (H) 0.51 - 0.95 mg/dL    Calcium 10.2 8.8 - 10.2 mg/dL    Glucose 232 (H) 70 - 99 mg/dL    GFR Estimate 11 (L) >60 mL/min/1.73m2   UA with Microscopic reflex to Culture     Status: Abnormal    Specimen: Urine, Clean Catch   Result Value Ref Range    Color Urine Yellow Colorless, Straw, Light Yellow, Yellow    Appearance Urine Clear Clear    Glucose Urine 50 (A) Negative mg/dL    Bilirubin Urine Negative Negative    Ketones Urine Negative Negative mg/dL    Specific Gravity Urine 1.013 1.003 - 1.035    Blood Urine Negative Negative    pH Urine 5.0 5.0 - 7.0    Protein Albumin Urine 50 (A) Negative mg/dL    Urobilinogen Urine Normal Normal, 2.0 mg/dL    Nitrite Urine Negative Negative    Leukocyte Esterase Urine Negative Negative    Mucus Urine Present (A) None Seen /LPF    RBC Urine 1 <=2 /HPF    WBC Urine 2 <=5 /HPF    Squamous Epithelials Urine 2 (H) <=1 /HPF    Narrative    Urine  Culture not indicated   CBC with platelets and differential     Status: Abnormal   Result Value Ref Range    WBC Count 10.1 4.0 - 11.0 10e3/uL    RBC Count 3.57 (L) 3.80 - 5.20 10e6/uL    Hemoglobin 11.5 (L) 11.7 - 15.7 g/dL    Hematocrit 34.0 (L) 35.0 - 47.0 %    MCV 95 78 - 100 fL    MCH 32.2 26.5 - 33.0 pg    MCHC 33.8 31.5 - 36.5 g/dL    RDW 12.8 10.0 - 15.0 %    Platelet Count 275 150 - 450 10e3/uL    % Neutrophils 69 %    % Lymphocytes 18 %    % Monocytes 9 %    % Eosinophils 3 %    % Basophils 0 %    % Immature Granulocytes 1 %    NRBCs per 100 WBC 0 <1 /100    Absolute Neutrophils 7.0 1.6 - 8.3 10e3/uL    Absolute Lymphocytes 1.8 0.8 - 5.3 10e3/uL    Absolute Monocytes 0.9 0.0 - 1.3 10e3/uL    Absolute Eosinophils 0.3 0.0 - 0.7 10e3/uL    Absolute Basophils 0.0 0.0 - 0.2 10e3/uL    Absolute Immature Granulocytes 0.1 <=0.4 10e3/uL    Absolute NRBCs 0.0 10e3/uL   Hemoglobin A1c     Status: Abnormal   Result Value Ref Range    Hemoglobin A1C 7.3 (H) <5.7 %   Glucose by meter     Status: Abnormal   Result Value Ref Range    GLUCOSE BY METER POCT 182 (H) 70 - 99 mg/dL   Basic metabolic panel     Status: Abnormal   Result Value Ref Range    Sodium 136 136 - 145 mmol/L    Potassium 3.7 3.4 - 5.3 mmol/L    Chloride 104 98 - 107 mmol/L    Carbon Dioxide (CO2) 19 (L) 22 - 29 mmol/L    Anion Gap 13 7 - 15 mmol/L    Urea Nitrogen 76.2 (H) 8.0 - 23.0 mg/dL    Creatinine 3.42 (H) 0.51 - 0.95 mg/dL    Calcium 9.5 8.8 - 10.2 mg/dL    Glucose 165 (H) 70 - 99 mg/dL    GFR Estimate 13 (L) >60 mL/min/1.73m2   Glucose by meter     Status: Abnormal   Result Value Ref Range    GLUCOSE BY METER POCT 177 (H) 70 - 99 mg/dL   Glucose by meter     Status: Abnormal   Result Value Ref Range    GLUCOSE BY METER POCT 137 (H) 70 - 99 mg/dL   Glucose by meter     Status: Abnormal   Result Value Ref Range    GLUCOSE BY METER POCT 148 (H) 70 - 99 mg/dL   Glucose by meter     Status: Abnormal   Result Value Ref Range    GLUCOSE BY METER POCT  197 (H) 70 - 99 mg/dL   Glucose by meter     Status: Abnormal   Result Value Ref Range    GLUCOSE BY METER POCT 152 (H) 70 - 99 mg/dL   Basic metabolic panel     Status: Abnormal   Result Value Ref Range    Sodium 139 136 - 145 mmol/L    Potassium 3.7 3.4 - 5.3 mmol/L    Chloride 110 (H) 98 - 107 mmol/L    Carbon Dioxide (CO2) 18 (L) 22 - 29 mmol/L    Anion Gap 11 7 - 15 mmol/L    Urea Nitrogen 67.1 (H) 8.0 - 23.0 mg/dL    Creatinine 2.85 (H) 0.51 - 0.95 mg/dL    Calcium 9.4 8.8 - 10.2 mg/dL    Glucose 75 70 - 99 mg/dL    GFR Estimate 16 (L) >60 mL/min/1.73m2   Glucose by meter     Status: Abnormal   Result Value Ref Range    GLUCOSE BY METER POCT 125 (H) 70 - 99 mg/dL   Glucose by meter     Status: Abnormal   Result Value Ref Range    GLUCOSE BY METER POCT 61 (L) 70 - 99 mg/dL   Glucose by meter     Status: Normal   Result Value Ref Range    GLUCOSE BY METER POCT 99 70 - 99 mg/dL   Glucose by meter     Status: Normal   Result Value Ref Range    GLUCOSE BY METER POCT 93 70 - 99 mg/dL   Glucose by meter     Status: Abnormal   Result Value Ref Range    GLUCOSE BY METER POCT 136 (H) 70 - 99 mg/dL   CBC with platelets differential     Status: Abnormal    Narrative    The following orders were created for panel order CBC with platelets differential.  Procedure                               Abnormality         Status                     ---------                               -----------         ------                     CBC with platelets and d...[[id number]]  Abnormal            Final result                 Please view results for these tests on the individual orders.       Macarena Le PA-C

## 2023-07-21 NOTE — PLAN OF CARE
PRIMARY DIAGNOSIS: MYESHA  OUTPATIENT/OBSERVATION GOALS TO BE MET BEFORE DISCHARGE:  1. ADLs back to baseline: Yes    2. Activity and level of assistance: Up with standby assistance.    3. Pain status: Improved-controlled with oral pain medications.    4. Return to near baseline physical activity: Yes     Discharge Planner Nurse   Safe discharge environment identified: Yes  Barriers to discharge: No       Entered by: Tomasa Condon RN 07/21/2023 12:50 PM   Pt's gallbladder drain emptied - 30 mL out.  Bag, not line, flushed.  Pt eating and drinking.  Makes needs known.  Please review provider order for any additional goals.   Nurse to notify provider when observation goals have been met and patient is ready for discharge.Goal Outcome Evaluation:                         99

## 2023-07-21 NOTE — PLAN OF CARE
PRIMARY DIAGNOSIS: MYESHA  OUTPATIENT/OBSERVATION GOALS TO BE MET BEFORE DISCHARGE:  1. ADLs back to baseline: Yes    2. Activity and level of assistance: Ambulating independently.    3. Pain status: Improved-controlled with oral pain medications.    4. Return to near baseline physical activity: Yes     Discharge Planner Nurse   Safe discharge environment identified: Yes  Barriers to discharge: Yes       Entered by: Tomasa Condon RN 07/21/2023 9:29 AM   Pt reports loose stools.  Believes stools are becoming more formed.  Reports pain 5/10 in abdomen - R side. AxOx4.  Eating and drinking.  Blood glucose was 61 before breakfast.  99 after eating.    Please review provider order for any additional goals.   Nurse to notify provider when observation goals have been met and patient is ready for discharge.Goal Outcome Evaluation:

## 2023-07-23 ENCOUNTER — PATIENT OUTREACH (OUTPATIENT)
Dept: CARE COORDINATION | Facility: CLINIC | Age: 83
End: 2023-07-23
Payer: COMMERCIAL

## 2023-07-23 NOTE — PROGRESS NOTES
[facility]    Background: Transitional Care Management program identified per system criteria and reviewed by [facility] team for possible outreach.    Assessment: Upon chart review, CCR Team member will not proceed with patient outreach related to this episode of Transitional Care Management program due to reason below:    Patient declined to answer all post hospital discharge questions with CCRC team member and disconnected call.    Plan: Transitional Care Management episode addressed appropriately per reason noted above.      Barbara Oliver MA  [facility] [place], [facility]    *Connected Care Resource Team does NOT follow patient ongoing. Referrals are identified based on internal discharge reports and the outreach is to ensure patient has an understanding of their discharge instructions.

## 2024-02-15 ENCOUNTER — NURSE TRIAGE (OUTPATIENT)
Dept: NURSING | Facility: CLINIC | Age: 84
End: 2024-02-15
Payer: COMMERCIAL

## 2024-02-16 NOTE — TELEPHONE ENCOUNTER
Patient returning PCP call from 30 minutes ago regarding lab results. Patient stated it is not urgent and will call the clinic tomorrow when they are open. No other questions or triage required.    Reason for Disposition   Lab result questions   Caller requesting routine or non-urgent lab result    Protocols used: Information Only Call - No Triage-A-AH, PCP Call - No Triage-A-AH